# Patient Record
Sex: MALE | Race: WHITE | NOT HISPANIC OR LATINO | Employment: FULL TIME | URBAN - METROPOLITAN AREA
[De-identification: names, ages, dates, MRNs, and addresses within clinical notes are randomized per-mention and may not be internally consistent; named-entity substitution may affect disease eponyms.]

---

## 2017-01-13 RX ORDER — PITAVASTATIN CALCIUM 2.09 MG/1
TABLET, FILM COATED ORAL
Qty: 15 TABLET | Refills: 0 | Status: SHIPPED | OUTPATIENT
Start: 2017-01-13 | End: 2017-03-08 | Stop reason: SDUPTHER

## 2017-03-09 RX ORDER — PITAVASTATIN CALCIUM 2.09 MG/1
TABLET, FILM COATED ORAL
Qty: 15 TABLET | Refills: 0 | Status: SHIPPED | OUTPATIENT
Start: 2017-03-09 | End: 2017-04-05 | Stop reason: SDUPTHER

## 2017-04-06 RX ORDER — PITAVASTATIN CALCIUM 2.09 MG/1
TABLET, FILM COATED ORAL
Qty: 15 TABLET | Refills: 0 | Status: SHIPPED | OUTPATIENT
Start: 2017-04-06 | End: 2017-05-10 | Stop reason: SDUPTHER

## 2017-04-17 ENCOUNTER — OFFICE VISIT (OUTPATIENT)
Dept: CARDIOLOGY | Facility: CLINIC | Age: 52
End: 2017-04-17

## 2017-04-17 VITALS
SYSTOLIC BLOOD PRESSURE: 118 MMHG | BODY MASS INDEX: 30.8 KG/M2 | HEIGHT: 68 IN | HEART RATE: 66 BPM | DIASTOLIC BLOOD PRESSURE: 72 MMHG | RESPIRATION RATE: 16 BRPM | WEIGHT: 203.2 LBS

## 2017-04-17 DIAGNOSIS — I25.10 CORONARY ARTERY DISEASE INVOLVING NATIVE CORONARY ARTERY OF NATIVE HEART WITHOUT ANGINA PECTORIS: Primary | ICD-10-CM

## 2017-04-17 DIAGNOSIS — E78.2 MIXED HYPERLIPIDEMIA: ICD-10-CM

## 2017-04-17 PROCEDURE — 93000 ELECTROCARDIOGRAM COMPLETE: CPT | Performed by: INTERNAL MEDICINE

## 2017-04-17 PROCEDURE — 99213 OFFICE O/P EST LOW 20 MIN: CPT | Performed by: INTERNAL MEDICINE

## 2017-04-17 NOTE — PROGRESS NOTES
PATIENTINFORMATION    Date of Office Visit: 2017  Encounter Provider: Shyann Robertson MD  Place of Service: [facility] CARDIOLOGY  Patient Name: Bert Addison  : 1965    Subjective:     Encounter Date:2017      Patient ID: Bert Addison is a 51 y.o. male.      History of Present Illness     This is a nice gentleman who self-referred for chest pain in 2015.  His symptoms were very typical so I sent him straight for a heart catheterization.  This showed a significant lesion in the proximal ramus branch and a significant lesion in the mid-right coronary artery, both of which were stented with drug-eluting stents.      He has done very well since that time.  Last year, I encouraged exercise and weight loss, and he did it and lost about 20 pounds.  Unfortunately, he got sick with some allergy problems in the fall and stopped exercising and the weight has crept back.  He is motivated to get himself back on track as far as his diet and exercising.  Right now, he is getting over an episode of diverticulitis and has not started an exercise program yet.  He feels good though and denies chest pain, shortness of breath, edema, lightheadedness, or palpitations.        Review of Systems   Constitution: Positive for malaise/fatigue. Negative for fever, weight gain and weight loss.   HENT: Negative for ear pain, hearing loss, nosebleeds and sore throat.    Eyes: Negative for double vision, pain, vision loss in left eye and vision loss in right eye.   Cardiovascular:        See history of present illness.   Respiratory: Negative for cough, shortness of breath, sleep disturbances due to breathing, snoring and wheezing.    Endocrine: Negative for cold intolerance, heat intolerance and polyuria.   Skin: Negative for itching, poor wound healing and rash.   Musculoskeletal: Negative for joint pain, joint swelling and myalgias.   Gastrointestinal: Positive for abdominal pain.  "Negative for diarrhea, hematochezia, nausea and vomiting.   Genitourinary: Negative for hematuria and hesitancy.   Neurological: Negative for numbness, paresthesias and seizures.   Psychiatric/Behavioral: Negative for depression. The patient is not nervous/anxious.            ECG 12 Lead  Date/Time: 4/17/2017 2:14 PM  Performed by: DULCE KELSEY  Authorized by: DULCE KELSEY   Comparison: compared with previous ECG from 1/15/2016  Similar to previous ECG  Rhythm: sinus rhythm  BPM: 66  Conduction: conduction normal  ST Segments: ST segments normal  T Waves: T waves normal  Clinical impression: normal ECG               Objective:     /72 (BP Location: Right arm, Patient Position: Sitting, Cuff Size: Adult)  Pulse 66  Resp 16  Ht 68\" (172.7 cm)  Wt 203 lb 3.2 oz (92.2 kg)  BMI 30.9 kg/m2 Body mass index is 30.9 kg/(m^2).     Physical Exam   Constitutional: He appears well-developed.   HENT:   Head: Normocephalic and atraumatic.   Eyes: Conjunctivae and lids are normal. Pupils are equal, round, and reactive to light. Lids are everted and swept, no foreign bodies found.   Neck: Normal range of motion. No JVD present. Carotid bruit is not present. No tracheal deviation present. No thyroid mass present.   Cardiovascular: Normal rate, regular rhythm and normal heart sounds.    Pulses:       Dorsalis pedis pulses are 2+ on the right side, and 2+ on the left side.   Pulmonary/Chest: Effort normal and breath sounds normal.   Abdominal: Normal appearance and bowel sounds are normal.   Musculoskeletal: Normal range of motion.   Neurological: He is alert. He has normal strength.   Skin: Skin is warm, dry and intact.   Psychiatric: He has a normal mood and affect. His behavior is normal.   Vitals reviewed.          Assessment/Plan:       1. Coronary Artery Disease  Assessment  • The patient has no angina    Plan  • Lifestyle modifications discussed include adhering to a heart healthy diet, avoidance of tobacco " products, maintenance of a healthy weight and regular exercise  • I encouraged him to get back and exercising.  He is due for a lipid panel.  I put the orders in.  He is going to make an appointment to see Dr. Mast.  He wonders about increasing the Livalo but I would like to see where his LDL is before making that change.    I will see him back in one year unless he is having problems sooner    Subjective - Objective  • There has been a previous stent procedure using PAM on or around 1/19/2015  • Current antiplatelet therapy includes aspirin 81 mg      Orders Placed This Encounter   Procedures   • Lipid Panel     Standing Status:   Future     Standing Expiration Date:   4/17/2018   • Comprehensive Metabolic Panel     Standing Status:   Future     Standing Expiration Date:   4/17/2018   • CBC (No Diff)     Standing Status:   Future     Standing Expiration Date:   4/17/2018      Bert Addison   Home Medication Instructions HAILEY:    Printed on:04/17/17 3868   Medication Information                      aspirin 81 MG tablet  Take  by mouth.             LIVALO 2 MG tablet tablet  TAKE 1/2 TABLET BY MOUTH DAILY                        Shyann Robertson MD  04/17/17  1:52 PM

## 2017-04-18 ENCOUNTER — OFFICE VISIT (OUTPATIENT)
Dept: SPORTS MEDICINE | Facility: CLINIC | Age: 52
End: 2017-04-18

## 2017-04-18 VITALS
OXYGEN SATURATION: 97 % | SYSTOLIC BLOOD PRESSURE: 120 MMHG | BODY MASS INDEX: 30.77 KG/M2 | HEIGHT: 68 IN | HEART RATE: 80 BPM | TEMPERATURE: 98.6 F | DIASTOLIC BLOOD PRESSURE: 80 MMHG | WEIGHT: 203 LBS

## 2017-04-18 DIAGNOSIS — M75.41 SHOULDER IMPINGEMENT, RIGHT: ICD-10-CM

## 2017-04-18 DIAGNOSIS — Z00.00 ANNUAL PHYSICAL EXAM: ICD-10-CM

## 2017-04-18 DIAGNOSIS — E78.2 MIXED HYPERLIPIDEMIA: ICD-10-CM

## 2017-04-18 DIAGNOSIS — M54.2 NECK PAIN: ICD-10-CM

## 2017-04-18 DIAGNOSIS — K57.32 DIVERTICULITIS OF LARGE INTESTINE WITHOUT PERFORATION OR ABSCESS WITHOUT BLEEDING: Primary | ICD-10-CM

## 2017-04-18 PROCEDURE — 99214 OFFICE O/P EST MOD 30 MIN: CPT | Performed by: FAMILY MEDICINE

## 2017-04-18 RX ORDER — CIPROFLOXACIN 500 MG/1
TABLET, FILM COATED ORAL
Refills: 0 | Status: ON HOLD | COMMUNITY
Start: 2017-04-11 | End: 2017-07-24

## 2017-04-18 RX ORDER — METRONIDAZOLE 500 MG/1
TABLET ORAL
Refills: 0 | COMMUNITY
Start: 2017-04-11 | End: 2017-04-18

## 2017-04-18 RX ORDER — HYDROCODONE BITARTRATE AND ACETAMINOPHEN 5; 325 MG/1; MG/1
TABLET ORAL
Refills: 0 | COMMUNITY
Start: 2017-04-11 | End: 2017-04-18

## 2017-04-18 NOTE — PROGRESS NOTES
"Bert is a 51 y.o. year old male    Chief Complaint   Patient presents with   • Hyperlipidemia     Pt would like to discuss   • Diverticulitis       History of Present Illness   HPI   Here to follow up on recent ER visit for diverticulitis. Last week he went to the ER because of lower abdominal pain, diagnosed with diverticulitis by CT scan. Treated with flagyl x 7 days and cipro x 10 days; feels about 50% better now. Pain is improving, dull in lower abdomen. Assoc with decreased appetite. No fever. *Never got his cscope last year    Also R shoulder pain, moderately severe, NKI, worse with lifting the arm. On and off for months. No n/t but assoc with pain at the base of the neck.    Due for routine labs as well. HLD/CAD stable.    I have reviewed the patient's medical history in detail and updated the computerized patient record.    Review of Systems   Constitutional: Positive for fatigue. Negative for chills and fever.   Respiratory: Negative.    Gastrointestinal: Negative for blood in stool.   Musculoskeletal: Positive for arthralgias and neck pain.   Neurological: Negative for weakness and numbness.       /80  Pulse 80  Temp 98.6 °F (37 °C)  Ht 68\" (172.7 cm)  Wt 203 lb (92.1 kg)  SpO2 97%  BMI 30.87 kg/m2     Physical Exam   Constitutional: He appears well-developed and well-nourished.   HENT:   Mouth/Throat: Oropharynx is clear and moist.   Neck: Normal range of motion.   Pulmonary/Chest: Effort normal.   Abdominal: Soft. Bowel sounds are normal. He exhibits no distension and no mass. There is tenderness (mild, LLQ). There is no rebound and no guarding.   Musculoskeletal:   R shoulder: Normal appearance. TTP subacromial space and posteriorly in the levator, trap, and rhomboids. Normal ROM but + painful arc, neer, garcia. Normal strength. Equivocal gonzalez.    Psychiatric: He has a normal mood and affect.   Vitals reviewed.       Diagnoses and all orders for this visit:    Diverticulitis of large " intestine without perforation or abscess without bleeding    Shoulder impingement, right  -     Ambulatory Referral to Physical Therapy Evaluate and treat    Neck pain  -     Ambulatory Referral to Physical Therapy Evaluate and treat    Mixed hyperlipidemia  -     CBC & Differential  -     Comprehensive Metabolic Panel  -     Lipid Panel    Annual physical exam  -     PSA  -     Thyroid Cascade Profile  -     Hepatitis C Antibody    Other orders  -     PENNSAID 2 % solution; Apply 1-2 pumps to affected area BID prn pain  -     Discontinue: metroNIDAZOLE (FLAGYL) 500 MG tablet; TK 1 T PO Q 12 H FOR 7 DAYS  -     ciprofloxacin (CIPRO) 500 MG tablet; TK 1 T PO Q 12 H FOR 10 DAYS  -     Discontinue: HYDROcodone-acetaminophen (NORCO) 5-325 MG per tablet; TK 1 TO 2 TS PO Q 6 H PRN P.    Diverticulitis appears to be clearing well. If not continuing to improve over the next 1-2 weeks, will need to consider repeat CT. Reminded of importance of colonoscopy screening, wait a few weeks after this resolved.     PT for impingement and neck pain (secondary plus ergonomic issues). Try pennsaid as well.    Repeat labs, continue statin.

## 2017-04-19 LAB
ALBUMIN SERPL-MCNC: 4.3 G/DL (ref 3.5–5.2)
ALBUMIN/GLOB SERPL: 1.3 G/DL
ALP SERPL-CCNC: 47 U/L (ref 39–117)
ALT SERPL-CCNC: 45 U/L (ref 1–41)
AST SERPL-CCNC: 44 U/L (ref 1–40)
BASOPHILS # BLD AUTO: 0.02 10*3/MM3 (ref 0–0.2)
BASOPHILS NFR BLD AUTO: 0.3 % (ref 0–1.5)
BILIRUB SERPL-MCNC: 0.6 MG/DL (ref 0.1–1.2)
BUN SERPL-MCNC: 11 MG/DL (ref 6–20)
BUN/CREAT SERPL: 12.6 (ref 7–25)
CALCIUM SERPL-MCNC: 9.3 MG/DL (ref 8.6–10.5)
CHLORIDE SERPL-SCNC: 99 MMOL/L (ref 98–107)
CHOLEST SERPL-MCNC: 100 MG/DL (ref 0–200)
CO2 SERPL-SCNC: 25.6 MMOL/L (ref 22–29)
CREAT SERPL-MCNC: 0.87 MG/DL (ref 0.76–1.27)
EOSINOPHIL # BLD AUTO: 0.13 10*3/MM3 (ref 0–0.7)
EOSINOPHIL NFR BLD AUTO: 1.9 % (ref 0.3–6.2)
ERYTHROCYTE [DISTWIDTH] IN BLOOD BY AUTOMATED COUNT: 12.7 % (ref 11.5–14.5)
GLOBULIN SER CALC-MCNC: 3.4 GM/DL
GLUCOSE SERPL-MCNC: 88 MG/DL (ref 65–99)
HCT VFR BLD AUTO: 41.4 % (ref 40.4–52.2)
HCV AB S/CO SERPL IA: >11 S/CO RATIO (ref 0–0.9)
HDLC SERPL-MCNC: 37 MG/DL (ref 40–60)
HGB BLD-MCNC: 13.7 G/DL (ref 13.7–17.6)
IMM GRANULOCYTES # BLD: 0.02 10*3/MM3 (ref 0–0.03)
IMM GRANULOCYTES NFR BLD: 0.3 % (ref 0–0.5)
LDLC SERPL CALC-MCNC: 47 MG/DL (ref 0–100)
LYMPHOCYTES # BLD AUTO: 1.29 10*3/MM3 (ref 0.9–4.8)
LYMPHOCYTES NFR BLD AUTO: 19.1 % (ref 19.6–45.3)
MCH RBC QN AUTO: 28.3 PG (ref 27–32.7)
MCHC RBC AUTO-ENTMCNC: 33.1 G/DL (ref 32.6–36.4)
MCV RBC AUTO: 85.5 FL (ref 79.8–96.2)
MONOCYTES # BLD AUTO: 0.41 10*3/MM3 (ref 0.2–1.2)
MONOCYTES NFR BLD AUTO: 6.1 % (ref 5–12)
NEUTROPHILS # BLD AUTO: 4.89 10*3/MM3 (ref 1.9–8.1)
NEUTROPHILS NFR BLD AUTO: 72.3 % (ref 42.7–76)
PLATELET # BLD AUTO: 137 10*3/MM3 (ref 140–500)
POTASSIUM SERPL-SCNC: 4.2 MMOL/L (ref 3.5–5.2)
PROT SERPL-MCNC: 7.7 G/DL (ref 6–8.5)
PSA SERPL-MCNC: 4.64 NG/ML (ref 0–4)
RBC # BLD AUTO: 4.84 10*6/MM3 (ref 4.6–6)
SODIUM SERPL-SCNC: 139 MMOL/L (ref 136–145)
TRIGL SERPL-MCNC: 80 MG/DL (ref 0–150)
TSH SERPL DL<=0.005 MIU/L-ACNC: 1.84 UIU/ML (ref 0.45–4.5)
VLDLC SERPL CALC-MCNC: 16 MG/DL (ref 5–40)
WBC # BLD AUTO: 6.76 10*3/MM3 (ref 4.5–10.7)

## 2017-04-20 DIAGNOSIS — R76.8 HEPATITIS C ANTIBODY TEST POSITIVE: Primary | ICD-10-CM

## 2017-04-24 LAB
HBV SURFACE AG SERPL QL IA: NEGATIVE
HCV RNA SERPL NAA+PROBE-ACNC: NORMAL IU/ML
HCV RNA SERPL NAA+PROBE-LOG IU: 6.88 LOG10 IU/ML
Lab: NORMAL
SPECIMEN STATUS: NORMAL
TEST INFORMATION: NORMAL
WRITTEN AUTHORIZATION: NORMAL

## 2017-04-26 ENCOUNTER — TELEPHONE (OUTPATIENT)
Dept: SPORTS MEDICINE | Facility: CLINIC | Age: 52
End: 2017-04-26

## 2017-04-26 DIAGNOSIS — B19.20 HEPATITIS C VIRUS INFECTION WITHOUT HEPATIC COMA, UNSPECIFIED CHRONICITY: ICD-10-CM

## 2017-04-26 DIAGNOSIS — R76.8 HEPATITIS C ANTIBODY TEST POSITIVE: Primary | ICD-10-CM

## 2017-04-26 NOTE — TELEPHONE ENCOUNTER
----- Message from Alfredo Mast MD sent at 4/26/2017 12:23 PM EDT -----  Tried to call patient. Follow up hepatitis testing appears to be positive - order placed for referral to GI to eval and discuss further. As usual, I'm glad to talk to the patient more about the details of these results personally when we can coordinate by phone or in person.

## 2017-05-11 RX ORDER — PITAVASTATIN CALCIUM 2.09 MG/1
TABLET, FILM COATED ORAL
Qty: 15 TABLET | Refills: 2 | Status: SHIPPED | OUTPATIENT
Start: 2017-05-11 | End: 2017-08-12 | Stop reason: SDUPTHER

## 2017-05-30 ENCOUNTER — OFFICE VISIT (OUTPATIENT)
Dept: GASTROENTEROLOGY | Facility: CLINIC | Age: 52
End: 2017-05-30

## 2017-05-30 VITALS
DIASTOLIC BLOOD PRESSURE: 92 MMHG | BODY MASS INDEX: 30.86 KG/M2 | HEIGHT: 68 IN | SYSTOLIC BLOOD PRESSURE: 122 MMHG | WEIGHT: 203.6 LBS

## 2017-05-30 DIAGNOSIS — Z12.11 ENCOUNTER FOR SCREENING FOR MALIGNANT NEOPLASM OF COLON: ICD-10-CM

## 2017-05-30 DIAGNOSIS — B18.2 CHRONIC HEPATITIS C WITHOUT HEPATIC COMA (HCC): Primary | ICD-10-CM

## 2017-05-30 LAB
INR PPP: 1.02 (ref 0.9–1.1)
PROTHROMBIN TIME: 13 SECONDS (ref 11.7–14.2)

## 2017-05-30 PROCEDURE — 99204 OFFICE O/P NEW MOD 45 MIN: CPT | Performed by: INTERNAL MEDICINE

## 2017-05-30 RX ORDER — SODIUM CHLORIDE, SODIUM LACTATE, POTASSIUM CHLORIDE, CALCIUM CHLORIDE 600; 310; 30; 20 MG/100ML; MG/100ML; MG/100ML; MG/100ML
30 INJECTION, SOLUTION INTRAVENOUS CONTINUOUS
Status: CANCELLED | OUTPATIENT
Start: 2017-05-30

## 2017-05-30 RX ORDER — SODIUM CHLORIDE 0.9 % (FLUSH) 0.9 %
1-10 SYRINGE (ML) INJECTION AS NEEDED
Status: CANCELLED | OUTPATIENT
Start: 2017-05-30

## 2017-06-02 ENCOUNTER — HOSPITAL ENCOUNTER (OUTPATIENT)
Dept: ULTRASOUND IMAGING | Facility: HOSPITAL | Age: 52
Discharge: HOME | End: 2017-06-02
Attending: INTERNAL MEDICINE | Admitting: INTERNAL MEDICINE

## 2017-06-02 DIAGNOSIS — B18.2 CHRONIC HEPATITIS C WITHOUT HEPATIC COMA (HCC): ICD-10-CM

## 2017-06-02 LAB
A2 MACROGLOB SERPL-MCNC: 177 MG/DL (ref 110–276)
ALT SERPL W P-5'-P-CCNC: 29 IU/L (ref 0–55)
APO A-I SERPL-MCNC: 126 MG/DL (ref 101–178)
BILIRUB SERPL-MCNC: 0.8 MG/DL (ref 0–1.2)
FIBROSIS SCORING:: ABNORMAL
FIBROSIS STAGE SERPL QL: ABNORMAL
GGT SERPL-CCNC: 17 IU/L (ref 0–65)
HAPTOGLOB SERPL-MCNC: 83 MG/DL (ref 34–200)
HCV AB SER QL: ABNORMAL
HCV GENTYP SERPL NAA+PROBE: NORMAL
HCV RNA SERPL NAA+PROBE-ACNC: NORMAL IU/ML
HCV RNA SERPL NAA+PROBE-LOG IU: 6.77 LOG10 IU/ML
LABORATORY COMMENT REPORT: ABNORMAL
LIVER FIBR SCORE SERPL CALC.FIBROSURE: 0.3 (ref 0–0.21)
Lab: NORMAL
NECROINFLAMM ACTIVITY SCORING:: ABNORMAL
NECROINFLAMMATORY ACT GRADE SERPL QL: ABNORMAL
NECROINFLAMMATORY ACT SCORE SERPL: 0.14 (ref 0–0.17)
SERVICE CMNT-IMP: ABNORMAL
TEST INFORMATION: NORMAL
TSH SERPL DL<=0.005 MIU/L-ACNC: 1.87 MIU/ML (ref 0.27–4.2)

## 2017-06-02 PROCEDURE — 76705 ECHO EXAM OF ABDOMEN: CPT

## 2017-06-21 ENCOUNTER — TELEPHONE (OUTPATIENT)
Dept: GASTROENTEROLOGY | Facility: CLINIC | Age: 52
End: 2017-06-21

## 2017-06-21 NOTE — TELEPHONE ENCOUNTER
Notes Recorded by Henry Monae MD on 6/8/2017 at 2:07 PM  Normal u/s of liver.  Patient called requesting the results of his ultrasound.  Advised it was normal. He verb understanding.

## 2017-07-24 ENCOUNTER — HOSPITAL ENCOUNTER (OUTPATIENT)
Facility: HOSPITAL | Age: 52
Setting detail: HOSPITAL OUTPATIENT SURGERY
Discharge: HOME | End: 2017-07-24
Attending: INTERNAL MEDICINE | Admitting: INTERNAL MEDICINE

## 2017-07-24 ENCOUNTER — ANESTHESIA EVENT (OUTPATIENT)
Dept: GASTROENTEROLOGY | Facility: HOSPITAL | Age: 52
End: 2017-07-24

## 2017-07-24 ENCOUNTER — ANESTHESIA (OUTPATIENT)
Dept: GASTROENTEROLOGY | Facility: HOSPITAL | Age: 52
End: 2017-07-24

## 2017-07-24 VITALS
BODY MASS INDEX: 30.34 KG/M2 | HEART RATE: 63 BPM | WEIGHT: 200.19 LBS | SYSTOLIC BLOOD PRESSURE: 105 MMHG | RESPIRATION RATE: 16 BRPM | TEMPERATURE: 97.7 F | DIASTOLIC BLOOD PRESSURE: 79 MMHG | OXYGEN SATURATION: 94 % | HEIGHT: 68 IN

## 2017-07-24 DIAGNOSIS — Z12.11 ENCOUNTER FOR SCREENING FOR MALIGNANT NEOPLASM OF COLON: ICD-10-CM

## 2017-07-24 DIAGNOSIS — B18.2 CHRONIC HEPATITIS C WITHOUT HEPATIC COMA (HCC): ICD-10-CM

## 2017-07-24 PROCEDURE — 88305 TISSUE EXAM BY PATHOLOGIST: CPT | Performed by: INTERNAL MEDICINE

## 2017-07-24 PROCEDURE — 45380 COLONOSCOPY AND BIOPSY: CPT | Performed by: INTERNAL MEDICINE

## 2017-07-24 PROCEDURE — 25010000002 PROPOFOL 10 MG/ML EMULSION: Performed by: ANESTHESIOLOGY

## 2017-07-24 RX ORDER — LIDOCAINE HYDROCHLORIDE 20 MG/ML
INJECTION, SOLUTION INFILTRATION; PERINEURAL AS NEEDED
Status: DISCONTINUED | OUTPATIENT
Start: 2017-07-24 | End: 2017-07-24 | Stop reason: SURG

## 2017-07-24 RX ORDER — SODIUM CHLORIDE 0.9 % (FLUSH) 0.9 %
1-10 SYRINGE (ML) INJECTION AS NEEDED
Status: DISCONTINUED | OUTPATIENT
Start: 2017-07-24 | End: 2017-07-24 | Stop reason: HOSPADM

## 2017-07-24 RX ORDER — SODIUM CHLORIDE, SODIUM LACTATE, POTASSIUM CHLORIDE, CALCIUM CHLORIDE 600; 310; 30; 20 MG/100ML; MG/100ML; MG/100ML; MG/100ML
30 INJECTION, SOLUTION INTRAVENOUS CONTINUOUS
Status: DISCONTINUED | OUTPATIENT
Start: 2017-07-24 | End: 2017-07-24 | Stop reason: HOSPADM

## 2017-07-24 RX ORDER — PROPOFOL 10 MG/ML
VIAL (ML) INTRAVENOUS CONTINUOUS PRN
Status: DISCONTINUED | OUTPATIENT
Start: 2017-07-24 | End: 2017-07-24 | Stop reason: SURG

## 2017-07-24 RX ADMIN — ALFENTANIL HYDROCHLORIDE 500 MCG: 500 INJECTION, SOLUTION INTRAVENOUS at 10:09

## 2017-07-24 RX ADMIN — SODIUM CHLORIDE, POTASSIUM CHLORIDE, SODIUM LACTATE AND CALCIUM CHLORIDE 30 ML/HR: 600; 310; 30; 20 INJECTION, SOLUTION INTRAVENOUS at 09:43

## 2017-07-24 RX ADMIN — LIDOCAINE HYDROCHLORIDE 50 MG: 20 INJECTION, SOLUTION INFILTRATION; PERINEURAL at 10:09

## 2017-07-24 RX ADMIN — PROPOFOL 180 MCG/KG/MIN: 10 INJECTION, EMULSION INTRAVENOUS at 10:09

## 2017-07-24 NOTE — ANESTHESIA PREPROCEDURE EVALUATION
Anesthesia Evaluation     Patient summary reviewed and Nursing notes reviewed   no history of anesthetic complications:  NPO Solid Status: > 6 hours  NPO Liquid Status: > 6 hours     Airway   Mallampati: II  TM distance: >3 FB  Neck ROM: full  no difficulty expected  Dental - normal exam     Pulmonary - normal exam    breath sounds clear to auscultation  (+) sleep apnea,   (-) rhonchi, decreased breath sounds, wheezes, rales, stridor  Cardiovascular - normal exam    Rhythm: regular  Rate: normal    (+) CAD, hyperlipidemia  (-) angina, murmur, weak pulses, friction rub, systolic click, carotid bruits, JVD, peripheral edema      Neuro/Psych- negative ROS  GI/Hepatic/Renal/Endo    (+) obesity,      Musculoskeletal (-) negative ROS    Abdominal   (+) obese,     Abdomen: soft.   Substance History - negative use     OB/GYN negative ob/gyn ROS         Other - negative ROS                                       Anesthesia Plan    ASA 3     MAC     intravenous induction   Anesthetic plan and risks discussed with patient.

## 2017-07-24 NOTE — H&P
Chief Complaint   Patient presents with   • Hepatitis C         Subjective          HPI      Bert Addison is a 51 y.o. male who presents for evaluation of hepatitis C.     Recently diagnosed with Hepatitis C by PCP due to slight elevation of LFTs.  1st made aware of + HCV Ab when in  in mid-90s.  Thinks he underwent liver bx around that time which was unremarkable.  He had follow up testing a few years later and was told no virus was detected.  Mode of acquisition is unclear.  No IVDA or known sexual contacts. No tatoos.  No blood transfusions.  No complaints of fatigue, weight loss, or specific GI issues.  Hx of CAD with two stents placed in 2015.  He takes regular ASA.  No angina.  He apparently had an episode of diverticulitis about 4 weeks ago which has responded well to oral antibiotics.  No prior colonoscopy.       No genotype available.  Viral load 7.6 million IU/mL.  No prior liver imaging.       Medical History         Past Medical History:   Diagnosis Date   • 3-vessel CAD     • Chest pain     • Headache, tension-type     • Hyperlipidemia     • Impingement syndrome of right shoulder     • Migraine     • Myalgia     • Sleep apnea              Current Outpatient Prescriptions:   •  LIVALO 2 MG tablet tablet, TAKE ONE HALF TABLET BY MOUTH EVERY DAY, Disp: 15 tablet, Rfl: 2  •  aspirin 81 MG tablet, Take  by mouth., Disp: , Rfl:   •  ciprofloxacin (CIPRO) 500 MG tablet, TK 1 T PO Q 12 H FOR 10 DAYS, Disp: , Rfl: 0  •  PENNSAID 2 % solution, Apply 1-2 pumps to affected area BID prn pain, Disp: 112 g, Rfl: 3  No Known Allergies       Social History    Social History            Social History   • Marital status:        Spouse name: N/A   • Number of children: N/A   • Years of education: N/A          Occupational History   • Not on file.             Social History Main Topics   • Smoking status: Former Smoker       Packs/day: 1.00       Years: 30.00       Types: Cigarettes        Quit date: 2012   • Smokeless tobacco: Not on file   • Alcohol use No         Comment: daily caffeine use   • Drug use: No   • Sexual activity: Defer           Other Topics Concern   • Not on file      Social History Narrative                Family History   Problem Relation Age of Onset   • Heart disease Mother     • Heart attack Mother     • Other Mother         Pacemaker   • Heart disease Father     • Hypertension Sister     • Heart attack Sister     • Heart attack Brother        Review of Systems   Constitutional: Negative.    HENT: Negative.    Eyes: Negative.    Respiratory: Negative.    Cardiovascular: Negative.    Gastrointestinal: Negative.    Skin: Negative.    Psychiatric/Behavioral: Negative.    All other systems reviewed and are negative.           Objective           Vitals:     05/30/17 1001   BP: 122/92      Physical Exam   Constitutional: He is oriented to person, place, and time. He appears well-developed and well-nourished.   HENT:   Head: Normocephalic and atraumatic.   Mouth/Throat: Oropharynx is clear and moist.   Eyes: Conjunctivae are normal. No scleral icterus.   Neck: Normal range of motion. Neck supple. No thyromegaly present.   Cardiovascular: Normal rate and regular rhythm.  Exam reveals no friction rub.    No murmur heard.  Pulmonary/Chest: Effort normal and breath sounds normal. No respiratory distress. He has no wheezes. He has no rales. He exhibits no tenderness.   Abdominal: Soft. Bowel sounds are normal. He exhibits no distension and no mass. There is no tenderness. There is no rebound and no guarding. No hernia.   Musculoskeletal: He exhibits no edema.   Lymphadenopathy:     He has no cervical adenopathy.     He has no axillary adenopathy.   Neurological: He is alert and oriented to person, place, and time.   Skin: Skin is warm and dry. No rash noted.   Psychiatric: He has a normal mood and affect. His behavior is normal. Judgment and thought content normal.   Vitals  reviewed.           Assessment/Plan          Bert was seen today for hepatitis c.     Diagnoses and all orders for this visit:     Chronic hepatitis C without hepatic coma  -     US Liver; Future  -     TSH  -     Hepatitis C RNA, Quantitative, PCR (graph)  -     Hepatitis C Genotype  -     HCV FibroSURE  -     Case Request; Standing  -     sodium chloride 0.9 % flush 1-10 mL; Infuse 1-10 mL into a venous catheter As Needed for Line Care.  -     lactated ringers infusion; Infuse 30 mL/hr into a venous catheter Continuous.  -     Case Request  -     Protime-INR     Encounter for screening for malignant neoplasm of colon  -     Case Request; Standing  -     sodium chloride 0.9 % flush 1-10 mL; Infuse 1-10 mL into a venous catheter As Needed for Line Care.  -     lactated ringers infusion; Infuse 30 mL/hr into a venous catheter Continuous.  -     Case Request  -     Protime-INR     Other orders  -     Implement Anesthesia orders day of procedure.; Standing  -     Obtain informed consent; Standing  -     Verify bowel prep was successful; Standing  -     Give tap water enema if bowel prep was insufficient; Standing  -     Insert Peripheral IV; Standing  -     Saline Lock & Maintain IV Access; Standing     Will check HCV genotype and HCV Fibrosure today - pt would appear to be good candidate for Harvoni assuming G1.  Will arrange for average risk screening colonoscopy in 4-6 weeks  Check baseline abdominal u/s.  Pt does have mild thrombocytopenia which may or may not be related to his underlying HCV.               Henry Monae M.D.  [facility], [place], operated by [facility]  [address] [place] [address]  Office: [phone]

## 2017-07-24 NOTE — PLAN OF CARE
Problem: Patient Care Overview (Adult)  Goal: Adult Individualization and Mutuality  Outcome: Ongoing (interventions implemented as appropriate)  Goal: Discharge Needs Assessment  Outcome: Ongoing (interventions implemented as appropriate)    Problem: GI Endoscopy (Adult)  Goal: Signs and Symptoms of Listed Potential Problems Will be Absent or Manageable (GI Endoscopy)  Outcome: Ongoing (interventions implemented as appropriate)    07/24/17 0917   GI Endoscopy   Problems Assessed (GI Endoscopy) all   Problems Present (GI Endoscopy) none

## 2017-07-24 NOTE — ANESTHESIA POSTPROCEDURE EVALUATION
Patient: Bert Addison    Procedure Summary     Date Anesthesia Start Anesthesia Stop Room / Location    07/24/17 1007 1030  [facility] /  [facility]       Procedure Diagnosis Surgeon Provider    COLONOSCOPY to cecum and TI with biopsy of ileocecal valve (N/A ) Encounter for screening for malignant neoplasm of colon; Chronic hepatitis C without hepatic coma  (Encounter for screening for malignant neoplasm of colon [Z12.11]; Chronic hepatitis C without hepatic coma [B18.2]) MD Rey Gamble MD          Anesthesia Type: MAC  Last vitals  BP   105/79 (07/24/17 1050)    Temp        Pulse   63 (07/24/17 1050)   Resp   16 (07/24/17 1050)    SpO2   94 % (07/24/17 1050)      Post Anesthesia Care and Evaluation      Comments: Patient discharged before being evaluated by an Anesthesiologist. No apparent complications per the record.  This case was not medically directed. I am completing this chart for medical records purposes; I personally have no medical involvement with this patient.--------------------            07/24/17               1050     --------------------   BP:       105/79     Pulse:      63       Resp:       16       Temp:                SpO2:      94%      --------------------

## 2017-07-24 NOTE — DISCHARGE INSTRUCTIONS
For the next 24 hours patient needs to be with a responsible adult.    For 24 hours DO NOT drive, operate machinery, appliances, drink alcohol, make important decisions or sign legal documents.    Start with a light or bland diet and advance to regular diet as tolerated.    Follow recommendations on procedure report provided by your doctor.    Call Dr Monae for problems [phone]    Problems may include but not limited to: large amounts of bleeding, trouble breathing, repeated vomiting, severe unrelieved pain, fever or chills.

## 2017-07-25 LAB
CYTO UR: NORMAL
LAB AP CASE REPORT: NORMAL
Lab: NORMAL
PATH REPORT.FINAL DX SPEC: NORMAL
PATH REPORT.GROSS SPEC: NORMAL

## 2017-08-07 ENCOUNTER — TELEPHONE (OUTPATIENT)
Dept: GASTROENTEROLOGY | Facility: CLINIC | Age: 52
End: 2017-08-07

## 2017-08-07 NOTE — TELEPHONE ENCOUNTER
Pt called the office and was extremely rude and states he needs to make an appt to follow up from his c/s. Pt states Dr Monae promised him last month that he would personally call him with U/S results but he never did. Pt states he had to call the office for results and had to call multiple tines and when he finally was able to reach someone, he did not get an apology for the hold up, was just told that the u/s was normal. Pt states now he had a c/s and Dr Monae told him that someone would call him to set up an appt to get him the medication he needs. Pt states no one ever called him and he needs to make the appt. Pt states if he wasn't so far along in the process with Dr Monae, he would just switch Drs. Pt states all he does is make promises that he does not follow through on. Pt states he will see the doctor to get the medication then write the review later. Advised can go ahead and give him the path results and make the appt if he'd like. Pt states he doesn't need the path results, he just needs to make an appt and it's not that hard to understand. Pt states it is unbelievable how terribly this office is run and how Dr Monae dos not follow through on anything he says he'll do for his pts. Pt states he needs to make an appt if I think I can handle that. Offered appt for 9/1/17. Pt not happy that appt three weeks from now but appt made for 9/1/17 at 8:30 AM.

## 2017-08-08 ENCOUNTER — TELEPHONE (OUTPATIENT)
Dept: GASTROENTEROLOGY | Facility: CLINIC | Age: 52
End: 2017-08-08

## 2017-08-08 NOTE — TELEPHONE ENCOUNTER
Attempted to give pt path results during phone call yesterday, but pt would not let me speak long enough to let him know the biopsies came back normal. Dr Monae can advise of results when pt is seen 9/1/17.     Health Maintenance updated to reflect C/S due 7/2027.

## 2017-08-14 RX ORDER — PITAVASTATIN CALCIUM 2.09 MG/1
TABLET, FILM COATED ORAL
Qty: 15 TABLET | Refills: 0 | Status: SHIPPED | OUTPATIENT
Start: 2017-08-14 | End: 2017-09-14 | Stop reason: SDUPTHER

## 2017-08-16 ENCOUNTER — OFFICE VISIT (OUTPATIENT)
Dept: SPORTS MEDICINE | Facility: CLINIC | Age: 52
End: 2017-08-16

## 2017-08-16 VITALS
WEIGHT: 206 LBS | SYSTOLIC BLOOD PRESSURE: 120 MMHG | DIASTOLIC BLOOD PRESSURE: 84 MMHG | HEART RATE: 83 BPM | OXYGEN SATURATION: 98 % | HEIGHT: 68 IN | BODY MASS INDEX: 31.22 KG/M2

## 2017-08-16 DIAGNOSIS — M54.2 CERVICALGIA: Primary | ICD-10-CM

## 2017-08-16 PROCEDURE — 99214 OFFICE O/P EST MOD 30 MIN: CPT | Performed by: FAMILY MEDICINE

## 2017-08-16 PROCEDURE — 72040 X-RAY EXAM NECK SPINE 2-3 VW: CPT | Performed by: FAMILY MEDICINE

## 2017-08-16 RX ORDER — PREDNISONE 10 MG/1
TABLET ORAL
Qty: 30 TABLET | Refills: 0 | Status: SHIPPED | OUTPATIENT
Start: 2017-08-16 | End: 2017-08-24 | Stop reason: SINTOL

## 2017-08-16 NOTE — PROGRESS NOTES
"Bert is a 51 y.o. year old male    Chief Complaint   Patient presents with   • Cervical Spine - Pain       History of Present Illness  Chronic (over a year) L sided neck pain but worse this week. Better with Extension and worse with forward flexion.  No radicular symptoms.  No known trauma.  Also pain at night.  Describes the pain is constant.  Also causing some left-sided posterior headaches with radiation to the temple.  No focal motor or sensory symptoms.  He has tried Pennsaid with minimal relief.  Patient is reluctant to take any anti-inflammatories are muscle relaxants.  Pain tends to originate over the superior medial left scapula and then can radiate up into the neck and down to the lower scapular area on the left.  No chest pain or shortness of breath.  No syncope or presyncope.  No palpitations.  No nausea or vomiting or diaphoresis.    I have reviewed the patient's medical history in detail and updated the computerized patient record.    Review of Systems   Constitutional: Negative for fever.   Musculoskeletal: Positive for neck pain.   Skin: Negative for wound.   Neurological: Negative for numbness.   All other systems reviewed and are negative.      /84  Pulse 83  Ht 68\" (172.7 cm)  Wt 206 lb (93.4 kg)  SpO2 98%  BMI 31.32 kg/m2     Physical Exam    Vital signs reviewed.   General: No acute distress.  Eyes: conjunctiva clear; pupils equally round and reactive  ENT: external ears and nose atraumatic; oropharynx clear  CV: no peripheral edema, 2+ distal pulses  Resp: normal respiratory effort, no use of accessory muscles  Skin: no rashes or wounds; normal turgor  Psych: mood and affect appropriate; recent and remote memory intact  Neuro: sensation to light touch intact    MSK Exam:  Cervical spine normal in general appearance, there is some mild tinnitus to palpation along the medial and superior border of the trapezius on the left.  Patient has full painless range of motion of the neck " however he does have pain at the end of foward flexion and right lateral bending.  Negative Spurling.  DTRs 1+ symmetric upper extremities, motor 5 out of 5.  Neurovascularly intact.    Cervical Spine X-Ray    Indication: Pain  Views: AP and Lateral    Findings:  No fracture  No bony lesions, there is some anterior endplate spurring on the inferior portions of C4 and C5.  Soft tissues normal  Normal disc spaces    No prior studies are available for comparison.      Diagnoses and all orders for this visit:    Cervicalgia  -     XR Spine Cervical 2 or 3 View  -     predniSONE (DELTASONE) 10 MG tablet; 4 tabs qd x 3 d, then 3 tabs qd x 3 d, then 2 tabs qd x 3 d, then 1 tab qd  x 3 d  -     Ambulatory Referral to Physical Therapy Evaluate and treat  Offer the patient most relaxants which she has deferred at this time.    EMR Dragon/Transcription disclaimer:    Much of this encounter note is an electronic transcription/translation of spoken language to printed text.  The electronic translation of spoken language may permit erroneous, or at times, nonsensical words or phrases to be inadvertently transcribed.  Although I have reviewed the note for such errors some may still exist.

## 2017-08-18 ENCOUNTER — OFFICE VISIT (OUTPATIENT)
Dept: GASTROENTEROLOGY | Facility: CLINIC | Age: 52
End: 2017-08-18

## 2017-08-18 ENCOUNTER — TELEPHONE (OUTPATIENT)
Dept: SPORTS MEDICINE | Facility: CLINIC | Age: 52
End: 2017-08-18

## 2017-08-18 VITALS
DIASTOLIC BLOOD PRESSURE: 86 MMHG | WEIGHT: 209 LBS | HEIGHT: 68 IN | BODY MASS INDEX: 31.67 KG/M2 | SYSTOLIC BLOOD PRESSURE: 138 MMHG | TEMPERATURE: 98 F

## 2017-08-18 DIAGNOSIS — B18.2 CHRONIC HEPATITIS C WITHOUT HEPATIC COMA (HCC): Primary | ICD-10-CM

## 2017-08-18 PROCEDURE — 99214 OFFICE O/P EST MOD 30 MIN: CPT | Performed by: INTERNAL MEDICINE

## 2017-08-18 RX ORDER — TIZANIDINE HYDROCHLORIDE 4 MG/1
CAPSULE, GELATIN COATED ORAL
Qty: 30 CAPSULE | Refills: 0 | Status: SHIPPED | OUTPATIENT
Start: 2017-08-18 | End: 2017-10-13 | Stop reason: SDUPTHER

## 2017-08-18 NOTE — PROGRESS NOTES
Chief Complaint   Patient presents with   • Follow-up     Subjective     HPI     Bert Addison is a 51 y.o. male who presents today for f/u.  Pt underwent recent screening colonoscopy which was unremarkable with exception of lipomatous IC valve.  He is here to discuss treatement options for his chronic HCV.  He appears to have G2 HCV, with no prior tx.  HCV Fibrosure shows only F1 fibrosis, and abdominal u/s was unremarkable.      Past Medical History:   Diagnosis Date   • 3-vessel CAD    • Chest pain    • Headache, tension-type    • Hepatitis C    • Hyperlipidemia    • Impingement syndrome of right shoulder    • Migraine    • Myalgia    • Sleep apnea        Social History     Social History   • Marital status:      Spouse name: N/A   • Number of children: N/A   • Years of education: N/A     Social History Main Topics   • Smoking status: Former Smoker     Packs/day: 1.00     Years: 30.00     Types: Cigarettes     Quit date: 2012   • Smokeless tobacco: None   • Alcohol use No      Comment: daily caffeine use   • Drug use: No   • Sexual activity: Defer     Other Topics Concern   • None     Social History Narrative         Current Outpatient Prescriptions:   •  aspirin 81 MG tablet, Take  by mouth., Disp: , Rfl:   •  LIVALO 2 MG tablet tablet, TAKE 1/2 TABLET BY MOUTH EVERY DAY, Disp: 15 tablet, Rfl: 0  •  predniSONE (DELTASONE) 10 MG tablet, 4 tabs qd x 3 d, then 3 tabs qd x 3 d, then 2 tabs qd x 3 d, then 1 tab qd  x 3 d, Disp: 30 tablet, Rfl: 0  •  PENNSAID 2 % solution, Apply 1-2 pumps to affected area BID prn pain, Disp: 112 g, Rfl: 3    Review of Systems   Constitutional: Negative.    Respiratory: Negative.    Cardiovascular: Negative.    Gastrointestinal: Negative.        Objective   Vitals:    08/18/17 1056   BP: 138/86   Temp: 98 °F (36.7 °C)       Physical Exam   Constitutional: He is oriented to person, place, and time. He appears well-developed and well-nourished.   HENT:   Head: Normocephalic and  atraumatic.   Cardiovascular: Normal rate and regular rhythm.    Pulmonary/Chest: Effort normal and breath sounds normal. No respiratory distress.   Abdominal: Soft. Bowel sounds are normal. He exhibits no distension and no mass. There is no tenderness. No hernia.   Neurological: He is alert and oriented to person, place, and time.   Skin: Skin is warm and dry.   Psychiatric: He has a normal mood and affect. His behavior is normal. Judgment and thought content normal.   Vitals reviewed.      Assessment/Plan   Assessment:     1. Chronic hepatitis C without hepatic coma        Plan:   Pt with G2 HCV with F1 fibrosis on Fibrosure - will arrange for 12 weeks of Epclusa.  Risks/benefits as well as most common side-effects of therapy discussed in detail with patient.  He will need office f/u 4 weeks after starting therapy for repeat labs to include viral load  Check Hep B serology today and arrange for vaccination if necessary        Henry Monae M.D.  [facility]  [address] [place] [address]  Office: [phone]

## 2017-08-18 NOTE — PROGRESS NOTES
Chief Complaint   Patient presents with   • Follow-up     Subjective     HPI     Bert Addison is a 51 y.o. male who presents ***    Past Medical History:   Diagnosis Date   • 3-vessel CAD    • Chest pain    • Headache, tension-type    • Hepatitis C    • Hyperlipidemia    • Impingement syndrome of right shoulder    • Migraine    • Myalgia    • Sleep apnea        Social History     Social History   • Marital status:      Spouse name: N/A   • Number of children: N/A   • Years of education: N/A     Social History Main Topics   • Smoking status: Former Smoker     Packs/day: 1.00     Years: 30.00     Types: Cigarettes     Quit date: 2012   • Smokeless tobacco: None   • Alcohol use No      Comment: daily caffeine use   • Drug use: No   • Sexual activity: Defer     Other Topics Concern   • None     Social History Narrative         Current Outpatient Prescriptions:   •  aspirin 81 MG tablet, Take  by mouth., Disp: , Rfl:   •  LIVALO 2 MG tablet tablet, TAKE 1/2 TABLET BY MOUTH EVERY DAY, Disp: 15 tablet, Rfl: 0  •  predniSONE (DELTASONE) 10 MG tablet, 4 tabs qd x 3 d, then 3 tabs qd x 3 d, then 2 tabs qd x 3 d, then 1 tab qd  x 3 d, Disp: 30 tablet, Rfl: 0  •  PENNSAID 2 % solution, Apply 1-2 pumps to affected area BID prn pain, Disp: 112 g, Rfl: 3    Review of Systems    Objective   Vitals:    08/18/17 1056   BP: 138/86   Temp: 98 °F (36.7 °C)       Physical Exam    Assessment/Plan   Assessment:     1. Chronic hepatitis C without hepatic coma          Plan:           Henry Monae M.D.  [facility]  [address] [place] [address]  Office: [phone]

## 2017-08-21 LAB
ALBUMIN SERPL-MCNC: 4.3 G/DL (ref 3.5–5.2)
ALBUMIN/GLOB SERPL: 1.4 G/DL
ALP SERPL-CCNC: 44 U/L (ref 39–117)
ALT SERPL-CCNC: 53 U/L (ref 1–41)
AST SERPL-CCNC: 22 U/L (ref 1–40)
BASOPHILS # BLD AUTO: 0.02 10*3/MM3 (ref 0–0.2)
BASOPHILS NFR BLD AUTO: 0.3 % (ref 0–1.5)
BILIRUB SERPL-MCNC: 0.7 MG/DL (ref 0.1–1.2)
BUN SERPL-MCNC: 16 MG/DL (ref 6–20)
BUN/CREAT SERPL: 20.5 (ref 7–25)
CALCIUM SERPL-MCNC: 8.5 MG/DL (ref 8.6–10.5)
CHLORIDE SERPL-SCNC: 103 MMOL/L (ref 98–107)
CO2 SERPL-SCNC: 24.6 MMOL/L (ref 22–29)
CREAT SERPL-MCNC: 0.78 MG/DL (ref 0.76–1.27)
EOSINOPHIL # BLD AUTO: 0.05 10*3/MM3 (ref 0–0.7)
EOSINOPHIL NFR BLD AUTO: 0.8 % (ref 0.3–6.2)
ERYTHROCYTE [DISTWIDTH] IN BLOOD BY AUTOMATED COUNT: 13.6 % (ref 11.5–14.5)
GLOBULIN SER CALC-MCNC: 3.1 GM/DL
GLUCOSE SERPL-MCNC: 154 MG/DL (ref 65–99)
HBV CORE AB SERPL QL IA: NEGATIVE
HBV SURFACE AB SER QL: NON REACTIVE
HBV SURFACE AG SERPL QL IA: NEGATIVE
HCT VFR BLD AUTO: 40.4 % (ref 40.4–52.2)
HCV RNA SERPL NAA+PROBE-ACNC: NORMAL IU/ML
HCV RNA SERPL NAA+PROBE-LOG IU: 6.94 LOG10 IU/ML
HGB BLD-MCNC: 13.4 G/DL (ref 13.7–17.6)
IMM GRANULOCYTES # BLD: 0.03 10*3/MM3 (ref 0–0.03)
IMM GRANULOCYTES NFR BLD: 0.5 % (ref 0–0.5)
INR PPP: 1.06 (ref 0.9–1.1)
LYMPHOCYTES # BLD AUTO: 0.99 10*3/MM3 (ref 0.9–4.8)
LYMPHOCYTES NFR BLD AUTO: 15.6 % (ref 19.6–45.3)
MCH RBC QN AUTO: 28.7 PG (ref 27–32.7)
MCHC RBC AUTO-ENTMCNC: 33.2 G/DL (ref 32.6–36.4)
MCV RBC AUTO: 86.5 FL (ref 79.8–96.2)
MONOCYTES # BLD AUTO: 0.25 10*3/MM3 (ref 0.2–1.2)
MONOCYTES NFR BLD AUTO: 3.9 % (ref 5–12)
NEUTROPHILS # BLD AUTO: 4.99 10*3/MM3 (ref 1.9–8.1)
NEUTROPHILS NFR BLD AUTO: 78.9 % (ref 42.7–76)
PLATELET # BLD AUTO: 103 10*3/MM3 (ref 140–500)
POTASSIUM SERPL-SCNC: 4.2 MMOL/L (ref 3.5–5.2)
PROT SERPL-MCNC: 7.4 G/DL (ref 6–8.5)
PROTHROMBIN TIME: 13.4 SECONDS (ref 11.7–14.2)
RBC # BLD AUTO: 4.67 10*6/MM3 (ref 4.6–6)
SODIUM SERPL-SCNC: 141 MMOL/L (ref 136–145)
TEST INFORMATION: NORMAL
WBC # BLD AUTO: 6.33 10*3/MM3 (ref 4.5–10.7)

## 2017-08-21 NOTE — PROGRESS NOTES
Please submit PA for Epclusa x 12 weeks for G2 HCV (treatment naive without cirrhosis).    F/U labs and o/v as per protocol once pt begins therapy.  Thanks.

## 2017-08-22 ENCOUNTER — TELEPHONE (OUTPATIENT)
Dept: SPORTS MEDICINE | Facility: CLINIC | Age: 52
End: 2017-08-22

## 2017-08-22 DIAGNOSIS — M54.2 CERVICALGIA: Primary | ICD-10-CM

## 2017-08-22 NOTE — TELEPHONE ENCOUNTER
1.  Set up MRI of cervical spine  2.  Also set up with pain mgmt at the hospital ( they can do injections that can help)

## 2017-08-22 NOTE — TELEPHONE ENCOUNTER
Pt called stating his pain is getting worse and would like to know what to do. He couldn't tolerate steroids.

## 2017-08-24 ENCOUNTER — OFFICE VISIT (OUTPATIENT)
Dept: SPORTS MEDICINE | Facility: CLINIC | Age: 52
End: 2017-08-24

## 2017-08-24 VITALS
OXYGEN SATURATION: 99 % | SYSTOLIC BLOOD PRESSURE: 120 MMHG | WEIGHT: 204 LBS | HEIGHT: 68 IN | BODY MASS INDEX: 30.92 KG/M2 | HEART RATE: 93 BPM | TEMPERATURE: 98.1 F | RESPIRATION RATE: 16 BRPM | DIASTOLIC BLOOD PRESSURE: 86 MMHG

## 2017-08-24 DIAGNOSIS — R50.9 FEVER, UNSPECIFIED FEVER CAUSE: ICD-10-CM

## 2017-08-24 DIAGNOSIS — R10.9 FLANK PAIN: Primary | ICD-10-CM

## 2017-08-24 LAB
BILIRUB BLD-MCNC: ABNORMAL MG/DL
CLARITY, POC: CLEAR
COLOR UR: ABNORMAL
EXPIRATION DATE: NORMAL
FLUAV AG NPH QL: NEGATIVE
FLUBV AG NPH QL: NEGATIVE
GLUCOSE UR STRIP-MCNC: NEGATIVE MG/DL
INTERNAL CONTROL: NORMAL
KETONES UR QL: NEGATIVE
LEUKOCYTE EST, POC: NEGATIVE
Lab: NORMAL
NITRITE UR-MCNC: NEGATIVE MG/ML
PH UR: 5.5 [PH] (ref 5–8)
PROT UR STRIP-MCNC: ABNORMAL MG/DL
RBC # UR STRIP: NEGATIVE /UL
SP GR UR: 1.02 (ref 1–1.03)
UROBILINOGEN UR QL: NORMAL

## 2017-08-24 PROCEDURE — 87804 INFLUENZA ASSAY W/OPTIC: CPT | Performed by: FAMILY MEDICINE

## 2017-08-24 PROCEDURE — 99214 OFFICE O/P EST MOD 30 MIN: CPT | Performed by: FAMILY MEDICINE

## 2017-08-24 PROCEDURE — 81003 URINALYSIS AUTO W/O SCOPE: CPT | Performed by: FAMILY MEDICINE

## 2017-08-24 NOTE — PROGRESS NOTES
"Bert is a 51 y.o. year old male    Chief Complaint   Patient presents with   • Fever     off and on for 4 days   • Chills   • Back Pain     low back pain       History of Present Illness   HPI   Fever/chills/myalgias started Monday. On and off. +night sweats. Assoc with aching flank pain. Some improvement with ibuprofen. Generalized, moderately severe.  Last night started having pain in the RUQ of the abdomen but resolved.     I have reviewed the patient's medical, family, and social history in detail and updated the computerized patient record.    Review of Systems   Constitutional: Positive for chills, fatigue and fever.   HENT: Negative.    Eyes: Negative.    Respiratory: Negative.    Cardiovascular: Negative.    Gastrointestinal: Positive for abdominal pain. Negative for constipation, diarrhea, nausea and vomiting.   Genitourinary: Positive for flank pain. Negative for dysuria and hematuria.   Musculoskeletal: Positive for myalgias.   Skin: Negative.    Neurological: Negative.    Psychiatric/Behavioral: Negative.        /86 (BP Location: Left arm, Patient Position: Sitting, Cuff Size: Adult)  Pulse 93  Temp 98.1 °F (36.7 °C) (Oral)   Resp 16  Ht 68\" (172.7 cm)  Wt 204 lb (92.5 kg)  SpO2 99%  BMI 31.02 kg/m2     Physical Exam   Constitutional: He is oriented to person, place, and time. He appears well-developed and well-nourished. He appears ill. No distress.   HENT:   Head: Normocephalic and atraumatic.   Mouth/Throat: Oropharynx is clear and moist. No oropharyngeal exudate.   Eyes: Conjunctivae are normal. Pupils are equal, round, and reactive to light.   Neck: Normal range of motion. No thyromegaly present.   Cardiovascular: Normal rate, regular rhythm and normal heart sounds.    Pulmonary/Chest: Effort normal and breath sounds normal. No respiratory distress. He has no wheezes. He has no rales.   Abdominal: Soft. Bowel sounds are normal. He exhibits no distension and no mass. There is no " tenderness. There is no rebound and no guarding.   Genitourinary: Prostate normal.   Lymphadenopathy:     He has no cervical adenopathy.   Neurological: He is alert and oriented to person, place, and time.   Skin: Skin is warm and dry. He is not diaphoretic.   There are a few erythematous papules on the right forearm, he states these seemed like mosquito bites or poison ivy and were very itchy a few days ago and are resolving   Psychiatric: He has a normal mood and affect.   Vitals reviewed.       Diagnoses and all orders for this visit:    Flank pain  -     POCT urinalysis dipstick, automated  -     CBC & Differential  -     Urinalysis With Microscopic  -     Urine Culture  -     Comprehensive Metabolic Panel  -     PSA  -     Lactate Dehydrogenase  -     C-reactive Protein  -     Sedimentation Rate  -     POC Influenza A / B  -     Mononucleosis Screen    Fever, unspecified fever cause  -     CBC & Differential  -     Urinalysis With Microscopic  -     Urine Culture  -     Comprehensive Metabolic Panel  -     PSA  -     Lactate Dehydrogenase  -     C-reactive Protein  -     Sedimentation Rate  -     POC Influenza A / B  -     Mononucleosis Screen    In office urinalysis reviewed, positive urobilinogen and protein.  Follow-up with for analysis.  Flu negative.  Suspicious for some systemic infectious process at hand.  We'll follow-up based on his lab results.  Consider CT scan of the abdomen and pelvis if labs are unrevealing

## 2017-08-26 LAB
ALBUMIN SERPL-MCNC: 4.2 G/DL (ref 3.5–5.2)
ALBUMIN/GLOB SERPL: 1.3 G/DL
ALP SERPL-CCNC: 74 U/L (ref 39–117)
ALT SERPL-CCNC: 112 U/L (ref 1–41)
APPEARANCE UR: CLEAR
AST SERPL-CCNC: 75 U/L (ref 1–40)
BACTERIA #/AREA URNS HPF: ABNORMAL /HPF
BACTERIA UR CULT: NO GROWTH
BACTERIA UR CULT: NORMAL
BASOPHILS # BLD AUTO: 0.16 10*3/MM3 (ref 0–0.2)
BASOPHILS NFR BLD AUTO: 2 % (ref 0–1.5)
BILIRUB SERPL-MCNC: 1.4 MG/DL (ref 0.1–1.2)
BILIRUB UR QL STRIP: NEGATIVE
BUN SERPL-MCNC: 15 MG/DL (ref 6–20)
BUN/CREAT SERPL: 14.7 (ref 7–25)
CALCIUM SERPL-MCNC: 9 MG/DL (ref 8.6–10.5)
CASTS URNS MICRO: ABNORMAL
CHLORIDE SERPL-SCNC: 99 MMOL/L (ref 98–107)
CO2 SERPL-SCNC: 26.8 MMOL/L (ref 22–29)
COLOR UR: (no result)
CREAT SERPL-MCNC: 1.02 MG/DL (ref 0.76–1.27)
CRP SERPL-MCNC: 3.77 MG/DL (ref 0–0.5)
DIFFERENTIAL COMMENT: NORMAL
EOSINOPHIL # BLD AUTO: 0.08 10*3/MM3 (ref 0–0.7)
EOSINOPHIL NFR BLD AUTO: 1 % (ref 0.3–6.2)
EPI CELLS #/AREA URNS HPF: ABNORMAL /HPF
ERYTHROCYTE [DISTWIDTH] IN BLOOD BY AUTOMATED COUNT: 13.8 % (ref 11.5–14.5)
ERYTHROCYTE [SEDIMENTATION RATE] IN BLOOD BY WESTERGREN METHOD: 13 MM/HR (ref 0–20)
GLOBULIN SER CALC-MCNC: 3.3 GM/DL
GLUCOSE SERPL-MCNC: 125 MG/DL (ref 65–99)
GLUCOSE UR QL: NEGATIVE
HCT VFR BLD AUTO: 42.2 % (ref 40.4–52.2)
HETEROPH AB SER QL LA: NEGATIVE
HGB BLD-MCNC: 14.2 G/DL (ref 13.7–17.6)
HGB UR QL STRIP: NEGATIVE
IMM GRANULOCYTES # BLD: 0 10*3/MM3 (ref 0–0.03)
IMM GRANULOCYTES NFR BLD: 0 % (ref 0–0.5)
KETONES UR QL STRIP: NEGATIVE
LDH SERPL-CCNC: 525 U/L (ref 135–225)
LEUKOCYTE ESTERASE UR QL STRIP: NEGATIVE
LYMPHOCYTES # BLD AUTO: 4.82 10*3/MM3 (ref 0.9–4.8)
LYMPHOCYTES NFR BLD AUTO: 60.4 % (ref 19.6–45.3)
MCH RBC QN AUTO: 29.3 PG (ref 27–32.7)
MCHC RBC AUTO-ENTMCNC: 33.6 G/DL (ref 32.6–36.4)
MCV RBC AUTO: 87.2 FL (ref 79.8–96.2)
MONOCYTES # BLD AUTO: 0.92 10*3/MM3 (ref 0.2–1.2)
MONOCYTES NFR BLD AUTO: 11.5 % (ref 5–12)
NEUTROPHILS # BLD AUTO: 2 10*3/MM3 (ref 1.9–8.1)
NEUTROPHILS NFR BLD AUTO: 25.1 % (ref 42.7–76)
NITRITE UR QL STRIP: NEGATIVE
PH UR STRIP: 5.5 [PH] (ref 5–8)
PLATELET # BLD AUTO: 71 10*3/MM3 (ref 140–500)
PLATELET BLD QL SMEAR: NORMAL
POTASSIUM SERPL-SCNC: 3.7 MMOL/L (ref 3.5–5.2)
PROT SERPL-MCNC: 7.5 G/DL (ref 6–8.5)
PROT UR QL STRIP: (no result)
PSA SERPL-MCNC: 3.22 NG/ML (ref 0–4)
RBC # BLD AUTO: 4.84 10*6/MM3 (ref 4.6–6)
RBC #/AREA URNS HPF: ABNORMAL /HPF
RBC MORPH BLD: NORMAL
SODIUM SERPL-SCNC: 141 MMOL/L (ref 136–145)
SP GR UR: 1.03 (ref 1–1.03)
UROBILINOGEN UR STRIP-MCNC: (no result) MG/DL
WBC # BLD AUTO: 7.98 10*3/MM3 (ref 4.5–10.7)
WBC #/AREA URNS HPF: ABNORMAL /HPF

## 2017-08-28 ENCOUNTER — TELEPHONE (OUTPATIENT)
Dept: SPORTS MEDICINE | Facility: CLINIC | Age: 52
End: 2017-08-28

## 2017-08-28 ENCOUNTER — HOSPITAL ENCOUNTER (OUTPATIENT)
Dept: MRI IMAGING | Facility: HOSPITAL | Age: 52
Discharge: HOME | End: 2017-08-28

## 2017-08-28 NOTE — TELEPHONE ENCOUNTER
Patients wife is calling saying he has had a headache for several days and was wondering if you would order an MRI or something for his head. He is currently at [facility] being admitted.

## 2017-08-29 ENCOUNTER — TELEPHONE (OUTPATIENT)
Dept: GASTROENTEROLOGY | Facility: CLINIC | Age: 52
End: 2017-08-29

## 2017-08-29 NOTE — TELEPHONE ENCOUNTER
Veronica DENTON paperwork sent to [facility]. Patient called and notified. Patient verbalized understanding and is agreeable to the plan.

## 2017-08-29 NOTE — TELEPHONE ENCOUNTER
----- Message from Henry Monae MD sent at 8/21/2017 11:05 AM EDT -----  Please submit PA for Epclusa x 12 weeks for G2 HCV (treatment naive without cirrhosis).    F/U labs and o/v as per protocol once pt begins therapy.  Thanks.

## 2017-08-30 ENCOUNTER — TRANSCRIBE ORDERS (OUTPATIENT)
Dept: PAIN MEDICINE | Facility: HOSPITAL | Age: 52
End: 2017-08-30

## 2017-08-30 ENCOUNTER — TELEPHONE (OUTPATIENT)
Dept: GASTROENTEROLOGY | Facility: CLINIC | Age: 52
End: 2017-08-30

## 2017-08-30 ENCOUNTER — HOSPITAL ENCOUNTER (OUTPATIENT)
Dept: PAIN MEDICINE | Facility: HOSPITAL | Age: 52
Discharge: HOME | End: 2017-08-30
Admitting: FAMILY MEDICINE

## 2017-08-30 ENCOUNTER — ANESTHESIA EVENT (OUTPATIENT)
Dept: PAIN MEDICINE | Facility: HOSPITAL | Age: 52
End: 2017-08-30

## 2017-08-30 ENCOUNTER — HOSPITAL ENCOUNTER (OUTPATIENT)
Dept: GENERAL RADIOLOGY | Facility: HOSPITAL | Age: 52
Discharge: HOME | End: 2017-08-30

## 2017-08-30 ENCOUNTER — ANESTHESIA (OUTPATIENT)
Dept: PAIN MEDICINE | Facility: HOSPITAL | Age: 52
End: 2017-08-30

## 2017-08-30 VITALS
TEMPERATURE: 97.8 F | RESPIRATION RATE: 16 BRPM | OXYGEN SATURATION: 98 % | SYSTOLIC BLOOD PRESSURE: 136 MMHG | BODY MASS INDEX: 30.31 KG/M2 | DIASTOLIC BLOOD PRESSURE: 72 MMHG | HEIGHT: 68 IN | WEIGHT: 200 LBS | HEART RATE: 97 BPM

## 2017-08-30 DIAGNOSIS — M54.2 CERVICALGIA: Primary | ICD-10-CM

## 2017-08-30 DIAGNOSIS — R52 PAIN: ICD-10-CM

## 2017-08-30 PROCEDURE — 77003 FLUOROGUIDE FOR SPINE INJECT: CPT

## 2017-08-30 PROCEDURE — 25010000002 FENTANYL CITRATE (PF) 100 MCG/2ML SOLUTION: Performed by: ANESTHESIOLOGY

## 2017-08-30 PROCEDURE — 25010000002 MIDAZOLAM PER 1 MG: Performed by: ANESTHESIOLOGY

## 2017-08-30 PROCEDURE — 25010000002 KETOROLAC TROMETHAMINE PER 15 MG: Performed by: ANESTHESIOLOGY

## 2017-08-30 PROCEDURE — C1755 CATHETER, INTRASPINAL: HCPCS

## 2017-08-30 PROCEDURE — 25010000002 METHYLPREDNISOLONE PER 80 MG: Performed by: ANESTHESIOLOGY

## 2017-08-30 RX ORDER — FENTANYL CITRATE 50 UG/ML
25 INJECTION, SOLUTION INTRAMUSCULAR; INTRAVENOUS
Status: DISCONTINUED | OUTPATIENT
Start: 2017-08-30 | End: 2017-08-31 | Stop reason: HOSPADM

## 2017-08-30 RX ORDER — METHYLPREDNISOLONE ACETATE 80 MG/ML
80 INJECTION, SUSPENSION INTRA-ARTICULAR; INTRALESIONAL; INTRAMUSCULAR; SOFT TISSUE ONCE
Status: COMPLETED | OUTPATIENT
Start: 2017-08-30 | End: 2017-08-30

## 2017-08-30 RX ORDER — SODIUM CHLORIDE 0.9 % (FLUSH) 0.9 %
1-10 SYRINGE (ML) INJECTION AS NEEDED
Status: DISCONTINUED | OUTPATIENT
Start: 2017-08-30 | End: 2017-08-31 | Stop reason: HOSPADM

## 2017-08-30 RX ORDER — MIDAZOLAM HYDROCHLORIDE 1 MG/ML
1 INJECTION INTRAMUSCULAR; INTRAVENOUS
Status: DISCONTINUED | OUTPATIENT
Start: 2017-08-30 | End: 2017-08-31 | Stop reason: HOSPADM

## 2017-08-30 RX ORDER — KETOROLAC TROMETHAMINE 30 MG/ML
30 INJECTION, SOLUTION INTRAMUSCULAR; INTRAVENOUS ONCE
Status: COMPLETED | OUTPATIENT
Start: 2017-08-30 | End: 2017-08-30

## 2017-08-30 RX ORDER — FENTANYL CITRATE 50 UG/ML
50 INJECTION, SOLUTION INTRAMUSCULAR; INTRAVENOUS
Status: DISCONTINUED | OUTPATIENT
Start: 2017-08-30 | End: 2017-08-31 | Stop reason: HOSPADM

## 2017-08-30 RX ORDER — LIDOCAINE HYDROCHLORIDE 10 MG/ML
1 INJECTION, SOLUTION INFILTRATION; PERINEURAL ONCE
Status: DISCONTINUED | OUTPATIENT
Start: 2017-08-30 | End: 2017-08-31 | Stop reason: HOSPADM

## 2017-08-30 RX ORDER — MIDAZOLAM HYDROCHLORIDE 1 MG/ML
1 INJECTION INTRAMUSCULAR; INTRAVENOUS ONCE
Status: COMPLETED | OUTPATIENT
Start: 2017-08-30 | End: 2017-08-30

## 2017-08-30 RX ADMIN — KETOROLAC TROMETHAMINE 30 MG: 30 INJECTION, SOLUTION INTRAMUSCULAR; INTRAVENOUS at 09:07

## 2017-08-30 RX ADMIN — FENTANYL CITRATE 50 MCG: 50 INJECTION INTRAMUSCULAR; INTRAVENOUS at 09:24

## 2017-08-30 RX ADMIN — MIDAZOLAM 1 MG: 1 INJECTION INTRAMUSCULAR; INTRAVENOUS at 09:23

## 2017-08-30 RX ADMIN — MIDAZOLAM 1 MG: 1 INJECTION INTRAMUSCULAR; INTRAVENOUS at 09:24

## 2017-08-30 RX ADMIN — METHYLPREDNISOLONE ACETATE 80 MG: 80 INJECTION, SUSPENSION INTRA-ARTICULAR; INTRALESIONAL; INTRAMUSCULAR; SOFT TISSUE at 09:29

## 2017-08-30 RX ADMIN — FENTANYL CITRATE 25 MCG: 50 INJECTION INTRAMUSCULAR; INTRAVENOUS at 09:25

## 2017-08-30 NOTE — TELEPHONE ENCOUNTER
Returned [facility]'s phone call. Questions regarding patient's prescription answered.

## 2017-08-30 NOTE — TELEPHONE ENCOUNTER
----- Message from Arnold Dubon sent at 8/30/2017 11:14 AM EDT -----  Regarding: RX  Contact: [phone]  ALLIE POLLARD [facility] IS FOLLOWING UP ON RX

## 2017-08-30 NOTE — ANESTHESIA PROCEDURE NOTES
PAIN Epidural block    Patient location during procedure: pain clinic  Start Time: 8/30/2017 9:21 AM  Stop Time: 8/30/2017 9:29 AM  Indication:at surgeon's request and procedure for pain  Performed By  Anesthesiologist: JESSICA HERMAN  Preanesthetic Checklist  Completed: patient identified, site marked, surgical consent, pre-op evaluation, timeout performed, IV checked, risks and benefits discussed and monitors and equipment checked  Additional Notes  Dx : Post-Op Diagnosis Codes:     * Cervical disc displacement (M50.20)     * Cervical spinal stenosis (M48.02)  80 mg depomedrol in epid  Prep:  Pt Position:prone (prone)  Sterile Tech:cap, gloves, mask and sterile barrier  Prep:chlorhexidine gluconate and isopropyl alcohol  Monitoring:blood pressure monitoring, continuous pulse oximetry and EKG  Procedure:  Approach:midline  Guidance: fluoroscopy and c arm pa and lat and loss of resistance  Location:cervical  Level:7-8 (interlaminar     C7T1)  Needle Type:Sentence Labtead  Needle Gauge:20  Aspiration:negative  Medications:  Depomedrol:80 mg  Preservative Free Saline:3mL    Post Assessment:  Post-procedure: bandaid.  Pt Tolerance:patient tolerated the procedure well with no apparent complications  Complications:no

## 2017-09-06 ENCOUNTER — ANESTHESIA (OUTPATIENT)
Dept: PAIN MEDICINE | Facility: HOSPITAL | Age: 52
End: 2017-09-06

## 2017-09-06 ENCOUNTER — ANESTHESIA EVENT (OUTPATIENT)
Dept: PAIN MEDICINE | Facility: HOSPITAL | Age: 52
End: 2017-09-06

## 2017-09-06 ENCOUNTER — HOSPITAL ENCOUNTER (OUTPATIENT)
Dept: PAIN MEDICINE | Facility: HOSPITAL | Age: 52
Discharge: HOME | End: 2017-09-06
Admitting: FAMILY MEDICINE

## 2017-09-06 ENCOUNTER — HOSPITAL ENCOUNTER (OUTPATIENT)
Dept: GENERAL RADIOLOGY | Facility: HOSPITAL | Age: 52
Discharge: HOME | End: 2017-09-06

## 2017-09-06 VITALS
HEART RATE: 94 BPM | SYSTOLIC BLOOD PRESSURE: 121 MMHG | DIASTOLIC BLOOD PRESSURE: 82 MMHG | TEMPERATURE: 97.8 F | WEIGHT: 200 LBS | RESPIRATION RATE: 16 BRPM | HEIGHT: 68 IN | OXYGEN SATURATION: 97 % | BODY MASS INDEX: 30.31 KG/M2

## 2017-09-06 DIAGNOSIS — R52 PAIN: ICD-10-CM

## 2017-09-06 PROCEDURE — 25010000002 MIDAZOLAM PER 1 MG: Performed by: ANESTHESIOLOGY

## 2017-09-06 PROCEDURE — C1755 CATHETER, INTRASPINAL: HCPCS

## 2017-09-06 PROCEDURE — 25010000002 FENTANYL CITRATE (PF) 100 MCG/2ML SOLUTION: Performed by: ANESTHESIOLOGY

## 2017-09-06 PROCEDURE — 0 IOPAMIDOL 41 % SOLUTION: Performed by: ANESTHESIOLOGY

## 2017-09-06 PROCEDURE — 77003 FLUOROGUIDE FOR SPINE INJECT: CPT

## 2017-09-06 PROCEDURE — 25010000002 METHYLPREDNISOLONE PER 80 MG: Performed by: ANESTHESIOLOGY

## 2017-09-06 RX ORDER — SODIUM CHLORIDE 0.9 % (FLUSH) 0.9 %
1-10 SYRINGE (ML) INJECTION AS NEEDED
Status: DISCONTINUED | OUTPATIENT
Start: 2017-09-06 | End: 2017-09-07 | Stop reason: HOSPADM

## 2017-09-06 RX ORDER — FENTANYL CITRATE 50 UG/ML
50 INJECTION, SOLUTION INTRAMUSCULAR; INTRAVENOUS AS NEEDED
Status: DISCONTINUED | OUTPATIENT
Start: 2017-09-06 | End: 2017-09-07 | Stop reason: HOSPADM

## 2017-09-06 RX ORDER — LIDOCAINE HYDROCHLORIDE 10 MG/ML
1 INJECTION, SOLUTION INFILTRATION; PERINEURAL ONCE
Status: DISCONTINUED | OUTPATIENT
Start: 2017-09-06 | End: 2017-09-07 | Stop reason: HOSPADM

## 2017-09-06 RX ORDER — METHYLPREDNISOLONE ACETATE 80 MG/ML
80 INJECTION, SUSPENSION INTRA-ARTICULAR; INTRALESIONAL; INTRAMUSCULAR; SOFT TISSUE ONCE
Status: COMPLETED | OUTPATIENT
Start: 2017-09-06 | End: 2017-09-06

## 2017-09-06 RX ORDER — MIDAZOLAM HYDROCHLORIDE 1 MG/ML
1 INJECTION INTRAMUSCULAR; INTRAVENOUS AS NEEDED
Status: DISCONTINUED | OUTPATIENT
Start: 2017-09-06 | End: 2017-09-07 | Stop reason: HOSPADM

## 2017-09-06 RX ADMIN — FENTANYL CITRATE 50 MCG: 50 INJECTION INTRAMUSCULAR; INTRAVENOUS at 13:12

## 2017-09-06 RX ADMIN — IOPAMIDOL 10 ML: 408 INJECTION, SOLUTION INTRATHECAL at 13:16

## 2017-09-06 RX ADMIN — METHYLPREDNISOLONE ACETATE 80 MG: 80 INJECTION, SUSPENSION INTRA-ARTICULAR; INTRALESIONAL; INTRAMUSCULAR; SOFT TISSUE at 13:16

## 2017-09-06 RX ADMIN — MIDAZOLAM 2 MG: 1 INJECTION INTRAMUSCULAR; INTRAVENOUS at 13:12

## 2017-09-06 NOTE — ANESTHESIA PROCEDURE NOTES
PAIN Epidural block    Patient location during procedure: pain clinic  Indication:procedure for pain  Performed By  Anesthesiologist: LAQUITA ERAZO  Preanesthetic Checklist  Completed: patient identified, site marked, surgical consent, pre-op evaluation, timeout performed, risks and benefits discussed and monitors and equipment checked  Additional Notes  Depomedrol - 80mg    Needle position confirmed by fluoroscopy and epidurogram using 2cc of [id number].    Diagnosis   Post-Op Diagnosis Codes:     * Cervical radiculopathy (M54.12)     * Cervical disc disorder (M50.90)      Prep:  Pt Position:prone  Sterile Tech:cap, gloves, mask and sterile barrier  Prep:chlorhexidine gluconate and isopropyl alcohol  Monitoring:blood pressure monitoring, continuous pulse oximetry and EKG  Procedure:  Approach:midline  Guidance: fluoroscopy  Location:cervical  Level:5-6  Needle Type:Tuohy  Needle Gauge:20  Aspiration:negative  Medications:  Depomedrol:80 mg  Preservative Free Saline:2mL  Isovue:2mL  Comments:Needle position confirmed by fluoroscopy and epidurogram using 2cc of [id number].  Post Assessment:  Post-procedure: bandaid.  Pt Tolerance:patient tolerated the procedure well with no apparent complications  Complications:no

## 2017-09-06 NOTE — INTERVAL H&P NOTE
[facility]  H&P reviewed. No changes since last visit.  Patient states   25-50% improvement since the last procedure/injection.    Diagnosis     * Cervical radiculopathy [M54.12]      Airway assessed since last visit. Airway class equals: 2.

## 2017-09-06 NOTE — H&P (VIEW-ONLY)
[facility]    History and Physical    Patient Name: Bert Addison  :  1965  MRN:  [id number]  Date of Admission: 2017    Subjective     Patient is a 51 y.o. male presents with chief complaint of chronic, constant, excruciating, severe, aching, stabbing, 7-10 / 10 neck, shoulder: bilateral and upper back pain and headaches.  Onset of symptoms was gradual starting 7 years ago.  Symptoms are associated/aggravated by stress and looking down. Symptoms improve with nothing    The following portions of the patients history were reviewed and updated as appropriate: current medications, allergies, past medical history, past surgical history, past family history, past social history and problem list                Objective     Past Medical History:   Past Medical History:   Diagnosis Date   • 3-vessel CAD    • Chest pain    • Headache, tension-type    • Hepatitis C    • Hyperlipidemia    • Impingement syndrome of right shoulder    • Migraine    • Myalgia    • Sleep apnea      Past Surgical History:   Past Surgical History:   Procedure Laterality Date   • COLONOSCOPY N/A 2017    lipomatous ileocecal valve, diverticulosis in sigmoid colon   • CORONARY ANGIOPLASTY WITH STENT PLACEMENT      Drug-eluting stent to the proximal ramus branch.  Drug-eluting stent to the mid right coronary artery.  2015.   • HERNIA REPAIR       Family History:   Family History   Problem Relation Age of Onset   • Heart disease Mother    • Heart attack Mother    • Other Mother      Pacemaker   • Heart disease Father    • Hypertension Sister    • Heart attack Sister    • Heart attack Brother      Social History:   Social History   Substance Use Topics   • Smoking status: Former Smoker     Packs/day: 1.00     Years: 30.00     Types: Cigarettes     Quit date:    • Smokeless tobacco: Never Used   • Alcohol use No      Comment: daily caffeine use       Vital Signs Range for the last 24 hours  Temperature:     Temp  Source:     BP: BP: ()/()    Pulse:     Respirations:     SPO2:     O2 Amount (l/min):     O2 Devices     Weight:           --------------------------------------------------------------------------------    Current Outpatient Prescriptions   Medication Sig Dispense Refill   • aspirin 81 MG tablet Take  by mouth.     • LIVALO 2 MG tablet tablet TAKE 1/2 TABLET BY MOUTH EVERY DAY 15 tablet 0   • PENNSAID 2 % solution Apply 1-2 pumps to affected area BID prn pain 112 g 3   • TiZANidine (ZANAFLEX) 4 MG capsule 1/2 to 1 tablet q 8 h prn 30 capsule 0     Current Facility-Administered Medications   Medication Dose Route Frequency Provider Last Rate Last Dose   • fentaNYL citrate (PF) (SUBLIMAZE) injection 50 mcg  50 mcg Intravenous Q5 Min PRN Lee Hector MD       • iopamidol (ISOVUE-M 200) injection 41%  2 mL Injection Once in imaging Lee Hector MD       • lidocaine (XYLOCAINE) 1 % injection 1 mL  1 mL Intradermal Once Lee Hector MD       • methylPREDNISolone acetate (DEPO-medrol) injection 80 mg  80 mg Intramuscular Once Lee Hector MD       • midazolam (VERSED) injection 1 mg  1 mg Intravenous Q5 Min PRN Lee Hector MD       • sodium chloride 0.9 % flush 1-10 mL  1-10 mL Intravenous PRN Lee Hector MD           --------------------------------------------------------------------------------  Assessment/Plan      Anesthesia Evaluation     Patient summary reviewed and Nursing notes reviewed          Airway   Mallampati: II  Dental - normal exam     Pulmonary - normal exam   (+) sleep apnea,   Cardiovascular - normal exam    (+) CAD,       Neuro/Psych- negative ROS and neuro exam normal  GI/Hepatic/Renal/Endo    (+)  hepatitis,     Musculoskeletal (-) normal exam    (+) neck pain,   Abdominal  - normal exam   Substance History - negative use     OB/GYN negative ob/gyn ROS         Other            Phys Exam Other: In severe pain; can't even sit still   Told him we can't do the procedure safely  without sedation and him being able to hold still  He will try to get a                            Diagnosis and Plan    Treatment Plan  ASA 3      Procedures: Cervical Epidural Steroid Injection(EVELIN), With fluoroscopy,       Anesthetic plan and risks discussed with patient.          Diagnosis     * Cervical disc displacement [M50.20]     * Cervical spinal stenosis [M48.02]

## 2017-09-13 ENCOUNTER — TELEPHONE (OUTPATIENT)
Dept: GASTROENTEROLOGY | Facility: CLINIC | Age: 52
End: 2017-09-13

## 2017-09-13 DIAGNOSIS — B18.2 CHRONIC HEPATITIS C WITHOUT HEPATIC COMA (HCC): Primary | ICD-10-CM

## 2017-09-15 RX ORDER — PITAVASTATIN CALCIUM 2.09 MG/1
TABLET, FILM COATED ORAL
Qty: 15 TABLET | Refills: 0 | Status: SHIPPED | OUTPATIENT
Start: 2017-09-15 | End: 2017-10-13 | Stop reason: SDUPTHER

## 2017-09-27 ENCOUNTER — TELEPHONE (OUTPATIENT)
Dept: SPORTS MEDICINE | Facility: CLINIC | Age: 52
End: 2017-09-27

## 2017-09-27 DIAGNOSIS — M48.02 STENOSIS OF CERVICAL SPINE: Primary | ICD-10-CM

## 2017-09-28 NOTE — TELEPHONE ENCOUNTER
Called and spoke with Dr. Hui office, they are able to get him in on Monday with Dr. Alexander. Patient has been notified.

## 2017-10-02 ENCOUNTER — OFFICE VISIT (OUTPATIENT)
Dept: NEUROSURGERY | Facility: CLINIC | Age: 52
End: 2017-10-02

## 2017-10-02 VITALS
HEIGHT: 68 IN | WEIGHT: 194 LBS | DIASTOLIC BLOOD PRESSURE: 88 MMHG | SYSTOLIC BLOOD PRESSURE: 130 MMHG | BODY MASS INDEX: 29.4 KG/M2

## 2017-10-02 DIAGNOSIS — M54.2 CERVICALGIA: Primary | ICD-10-CM

## 2017-10-02 PROCEDURE — 99243 OFF/OP CNSLTJ NEW/EST LOW 30: CPT | Performed by: NEUROLOGICAL SURGERY

## 2017-10-02 RX ORDER — ACETAMINOPHEN, ASPIRIN AND CAFFEINE 250; 250; 65 MG/1; MG/1; MG/1
1 TABLET, FILM COATED ORAL EVERY 6 HOURS PRN
COMMUNITY
End: 2018-04-19

## 2017-10-02 RX ORDER — VELPATASVIR AND SOFOSBUVIR 100; 400 MG/1; MG/1
TABLET, FILM COATED ORAL
Refills: 2 | COMMUNITY
Start: 2017-09-28 | End: 2018-04-19

## 2017-10-02 RX ORDER — IBUPROFEN 800 MG/1
1000 TABLET ORAL EVERY 6 HOURS PRN
COMMUNITY
End: 2018-02-09

## 2017-10-02 NOTE — PROGRESS NOTES
Subjective   Patient ID: Bert Addison is a 52 y.o. male who is being seen for consultation today at the request of Alfredo Mast MD for neck pain. He had a cervical MRI at [facility] on 8/28/17. He presents unaccompanied.     History of Present Illness Patient is s/p cardiac stenting. He has a history of Hepatitis C related to vaccination while in the [employer].  He is 1 month into his treatment for this. Patient presents today for left sided shoulder pain.  This started with years of neck pain with 2 months of left sided shoulder region pain.  He had pain related to a gold outing in august.  He reprots headaches and pain in the sided of his muscle.  He is a manager of factories.  Static positions aggravate.  He is taking 1000mg  Ibuprofen 1x per week.  He does not feel this helps.  Patient reports DASH x 2 recently.  These are partially effective.  Pain is rated 5/10 and is in the trapezius on the left.      The following portions of the patient's history were reviewed and updated as appropriate: allergies, current medications, past family history, past medical history, past social history, past surgical history and problem list.    Review of Systems   Constitutional: Negative for chills and fever.   Respiratory: Negative for cough and shortness of breath.    Cardiovascular: Negative for chest pain, palpitations and leg swelling.   Gastrointestinal: Negative for abdominal pain and constipation.   Genitourinary: Negative for difficulty urinating and enuresis.   Musculoskeletal: Positive for neck pain. Negative for gait problem.   Skin: Negative for rash.   Neurological: Negative for weakness and numbness (or tingling).   Hematological: Does not bruise/bleed easily.   Psychiatric/Behavioral: Negative for sleep disturbance.       Objective   Physical Exam   Constitutional: He is oriented to person, place, and time.   Neurological: He is oriented to person, place, and time. Gait normal.   Reflex Scores:        Tricep reflexes are 1+ on the right side and 1+ on the left side.       Bicep reflexes are 1+ on the right side and 1+ on the left side.       Brachioradialis reflexes are 1+ on the right side and 1+ on the left side.       Patellar reflexes are 1+ on the right side and 1+ on the left side.       Achilles reflexes are 1+ on the right side and 1+ on the left side.    Neurologic Exam     Mental Status   Oriented to person, place, and time.     Motor Exam   Muscle bulk: normal  Overall muscle tone: normal    Strength   Right deltoid: 5/5  Left deltoid: 5/5  Right biceps: 5/5  Left biceps: 5/5  Right triceps: 5/5  Left triceps: 5/5  Right wrist flexion: 5/5  Left wrist flexion: 5/5  Right wrist extension: 5/5  Left wrist extension: 5/5  Right interossei: 5/5  Left interossei: 5/5  Right iliopsoas: 5/5  Left iliopsoas: 5/5  Right quadriceps: 5/5  Left quadriceps: 5/5  Right hamstrin/5  Left hamstrin/5  Right anterior tibial: 5/5  Left anterior tibial: 5/5  Right gastroc: 5/5  Left gastroc: 5/5    Sensory Exam   Right arm light touch: normal  Left arm light touch: normal  Right leg light touch: normal  Left leg light touch: normal  Right arm pinprick: normal  Left arm pinprick: normal  Right leg pinprick: normal  Left leg pinprick: normal    Gait, Coordination, and Reflexes     Gait  Gait: normal    Reflexes   Right brachioradialis: 1+  Left brachioradialis: 1+  Right biceps: 1+  Left biceps: 1+  Right triceps: 1+  Left triceps: 1+  Right patellar: 1+  Left patellar: 1+  Right achilles: 1+  Left achilles: 1+  Right [place]: absent  Left [place]: absent  Right ankle clonus: absent  Left ankle clonus: absent      Assessment/Plan   Independent Review of Radiographic Studies:    Central HNP at c56.  No cord signal change.  Has some kyphosis centered at this level.    Medical Decision Making:  Patient with central HNP and no significant foraminal stenosis.  He has responded to being active.  I think he should schedule  NSAIDS and start PT.  He may respond to some anti-spasmodics.  We will start some diclofenac for 1 month and then he should start PT and I advise traction.  We discussed the cardiac risk of nsaids.  He is quite healthy overall. Surgery would be a last resort for neck pain.  We will refer to PT if he fails to respond.      Bert was seen today for neck pain.    Diagnoses and all orders for this visit:    Cervicalgia    Other orders  -     diclofenac (VOLTAREN) 50 MG EC tablet; Take 1 tablet by mouth 2 (Two) Times a Day.      No Follow-up on file.

## 2017-10-08 LAB
ALBUMIN SERPL-MCNC: 4.5 G/DL (ref 3.5–5.2)
ALBUMIN/GLOB SERPL: 1.3 G/DL
ALP SERPL-CCNC: 42 U/L (ref 39–117)
ALT SERPL-CCNC: 35 U/L (ref 1–41)
AST SERPL-CCNC: 27 U/L (ref 1–40)
BILIRUB SERPL-MCNC: 1 MG/DL (ref 0.1–1.2)
BUN SERPL-MCNC: 10 MG/DL (ref 6–20)
BUN/CREAT SERPL: 12.8 (ref 7–25)
CALCIUM SERPL-MCNC: 9.1 MG/DL (ref 8.6–10.5)
CHLORIDE SERPL-SCNC: 104 MMOL/L (ref 98–107)
CO2 SERPL-SCNC: 28.2 MMOL/L (ref 22–29)
CREAT SERPL-MCNC: 0.78 MG/DL (ref 0.76–1.27)
ERYTHROCYTE [DISTWIDTH] IN BLOOD BY AUTOMATED COUNT: 14.2 % (ref 11.5–14.5)
GLOBULIN SER CALC-MCNC: 3.4 GM/DL
GLUCOSE SERPL-MCNC: 129 MG/DL (ref 65–99)
HCT VFR BLD AUTO: 37 % (ref 40.4–52.2)
HCV RNA SERPL NAA+PROBE-ACNC: NORMAL IU/ML
HGB BLD-MCNC: 12.7 G/DL (ref 13.7–17.6)
MCH RBC QN AUTO: 29.7 PG (ref 27–32.7)
MCHC RBC AUTO-ENTMCNC: 34.3 G/DL (ref 32.6–36.4)
MCV RBC AUTO: 86.4 FL (ref 79.8–96.2)
PLATELET # BLD AUTO: 97 10*3/MM3 (ref 140–500)
POTASSIUM SERPL-SCNC: 4.2 MMOL/L (ref 3.5–5.2)
PROT SERPL-MCNC: 7.9 G/DL (ref 6–8.5)
RBC # BLD AUTO: 4.28 10*6/MM3 (ref 4.6–6)
SODIUM SERPL-SCNC: 143 MMOL/L (ref 136–145)
TEST INFORMATION: NORMAL
WBC # BLD AUTO: 4.31 10*3/MM3 (ref 4.5–10.7)

## 2017-10-09 ENCOUNTER — OFFICE VISIT (OUTPATIENT)
Dept: GASTROENTEROLOGY | Facility: CLINIC | Age: 52
End: 2017-10-09

## 2017-10-09 VITALS
BODY MASS INDEX: 29.37 KG/M2 | HEIGHT: 68 IN | SYSTOLIC BLOOD PRESSURE: 136 MMHG | TEMPERATURE: 98.1 F | DIASTOLIC BLOOD PRESSURE: 84 MMHG | WEIGHT: 193.8 LBS

## 2017-10-09 DIAGNOSIS — B18.2 CHRONIC HEPATITIS C WITHOUT HEPATIC COMA (HCC): Primary | ICD-10-CM

## 2017-10-09 DIAGNOSIS — D69.6 THROMBOCYTOPENIA (HCC): ICD-10-CM

## 2017-10-09 PROCEDURE — 99213 OFFICE O/P EST LOW 20 MIN: CPT | Performed by: INTERNAL MEDICINE

## 2017-10-09 NOTE — PROGRESS NOTES
Chief Complaint   Patient presents with   • Hepatic Disease     Subjective     HPI  Bert Addison is a 52 y.o. male who presents Today for follow-up.  Mr. Mann is now 4 weeks into therapy for his genotype 2 hepatitis C with occlusive which she seems to be tolerating quite well.  He's had a a few mild headaches but the denies nausea or abdominal pain.  He's had some occasional nocturnal muscle cramps but relates this to increase exercise activity over the last few weeks.  We discussed his 4 week viral load which has reassuringly returned undetectable.  His CBC does show a slight decrease in his white blood cell count and hemoglobin and no change in his baseline thrombocytopenia.    Past Medical History:   Diagnosis Date   • 3-vessel CAD    • Chest pain    • Headache, tension-type    • Hepatitis C    • Hyperlipidemia    • Impingement syndrome of right shoulder    • Migraine    • Myalgia    • Neck pain    • Sleep apnea        Social History     Social History   • Marital status:      Spouse name: N/A   • Number of children: N/A   • Years of education: N/A     Social History Main Topics   • Smoking status: Former Smoker     Packs/day: 1.00     Years: 30.00     Types: Cigarettes     Quit date: 2012   • Smokeless tobacco: Never Used   • Alcohol use No      Comment: daily caffeine use   • Drug use: No   • Sexual activity: Defer     Other Topics Concern   • None     Social History Narrative         Current Outpatient Prescriptions:   •  aspirin 81 MG tablet, Take  by mouth., Disp: , Rfl:   •  diclofenac (VOLTAREN) 50 MG EC tablet, Take 1 tablet by mouth 2 (Two) Times a Day., Disp: 65 tablet, Rfl: 2  •  EPCLUSA 400-100 MG tablet, TK ONE T PO DAILY, Disp: , Rfl: 2  •  LIVALO 2 MG tablet tablet, TAKE ONE HALF TABLET BY MOUTH EVERY DAY, Disp: 15 tablet, Rfl: 0  •  Unable to find, 1 each 1 (One) Time. Potassium OTC meq ?, Disp: , Rfl:   •  aspirin-acetaminophen-caffeine (EXCEDRIN MIGRAINE) 250-250-65 MG per tablet,  Take 1 tablet by mouth Every 6 (Six) Hours As Needed for Headache., Disp: , Rfl:   •  ibuprofen (ADVIL,MOTRIN) 800 MG tablet, Take 1,000 mg by mouth Every 6 (Six) Hours As Needed for Mild Pain ., Disp: , Rfl:   •  TiZANidine (ZANAFLEX) 4 MG capsule, 1/2 to 1 tablet q 8 h prn, Disp: 30 capsule, Rfl: 0    Review of Systems   Constitutional: Negative.    Respiratory: Negative.    Cardiovascular: Negative.    Gastrointestinal: Negative.    Neurological: Positive for headaches.       Objective   Vitals:    10/09/17 1433   BP: 136/84   Temp: 98.1 °F (36.7 °C)       Physical Exam   Constitutional: He is oriented to person, place, and time. He appears well-developed and well-nourished.   HENT:   Head: Normocephalic and atraumatic.   Abdominal: Soft. Bowel sounds are normal. He exhibits no distension and no mass. There is no tenderness. No hernia.   Neurological: He is alert and oriented to person, place, and time.   Skin: Skin is warm and dry.   Psychiatric: He has a normal mood and affect. His behavior is normal. Judgment and thought content normal.   Vitals reviewed.      Assessment/Plan   Assessment:     1. Chronic hepatitis C without hepatic coma    2. Pancytopenia      Plan:   Repeat CBC in 2 weeks to monitor leukopenia and thrombocytopenia  Continue Epclusa - 4 week viral load undetectable  Follow up office in 8 weeks         Henry Monae M.D.  [facility]  [address] [place] [address]  Office: [phone]

## 2017-10-16 RX ORDER — TIZANIDINE 4 MG/1
TABLET ORAL
Qty: 30 TABLET | Refills: 0 | Status: SHIPPED | OUTPATIENT
Start: 2017-10-16 | End: 2017-11-09 | Stop reason: SDUPTHER

## 2017-10-16 RX ORDER — PITAVASTATIN CALCIUM 2.09 MG/1
TABLET, FILM COATED ORAL
Qty: 15 TABLET | Refills: 0 | Status: SHIPPED | OUTPATIENT
Start: 2017-10-16 | End: 2017-11-09 | Stop reason: SDUPTHER

## 2017-10-25 ENCOUNTER — TELEPHONE (OUTPATIENT)
Dept: GASTROENTEROLOGY | Facility: CLINIC | Age: 52
End: 2017-10-25

## 2017-10-25 LAB
ALBUMIN SERPL-MCNC: 4.3 G/DL (ref 3.5–5.2)
ALBUMIN/GLOB SERPL: 1.3 G/DL
ALP SERPL-CCNC: 43 U/L (ref 39–117)
ALT SERPL-CCNC: 33 U/L (ref 1–41)
AST SERPL-CCNC: 31 U/L (ref 1–40)
BILIRUB SERPL-MCNC: 0.8 MG/DL (ref 0.1–1.2)
BUN SERPL-MCNC: 17 MG/DL (ref 6–20)
BUN/CREAT SERPL: 19.1 (ref 7–25)
CALCIUM SERPL-MCNC: 9.2 MG/DL (ref 8.6–10.5)
CHLORIDE SERPL-SCNC: 102 MMOL/L (ref 98–107)
CO2 SERPL-SCNC: 27.5 MMOL/L (ref 22–29)
CREAT SERPL-MCNC: 0.89 MG/DL (ref 0.76–1.27)
ERYTHROCYTE [DISTWIDTH] IN BLOOD BY AUTOMATED COUNT: 13.6 % (ref 11.5–14.5)
GFR SERPLBLD CREATININE-BSD FMLA CKD-EPI: 109 ML/MIN/1.73
GFR SERPLBLD CREATININE-BSD FMLA CKD-EPI: 90 ML/MIN/1.73
GLOBULIN SER CALC-MCNC: 3.3 GM/DL
GLUCOSE SERPL-MCNC: 113 MG/DL (ref 65–99)
HCT VFR BLD AUTO: 37.8 % (ref 40.4–52.2)
HCV RNA SERPL NAA+PROBE-ACNC: NORMAL IU/ML
HGB BLD-MCNC: 12.6 G/DL (ref 13.7–17.6)
MCH RBC QN AUTO: 29.7 PG (ref 27–32.7)
MCHC RBC AUTO-ENTMCNC: 33.3 G/DL (ref 32.6–36.4)
MCV RBC AUTO: 89.2 FL (ref 79.8–96.2)
PLATELET # BLD AUTO: 111 10*3/MM3 (ref 140–500)
POTASSIUM SERPL-SCNC: 4.1 MMOL/L (ref 3.5–5.2)
PROT SERPL-MCNC: 7.6 G/DL (ref 6–8.5)
RBC # BLD AUTO: 4.24 10*6/MM3 (ref 4.6–6)
SODIUM SERPL-SCNC: 142 MMOL/L (ref 136–145)
TEST INFORMATION: NORMAL
WBC # BLD AUTO: 7.02 10*3/MM3 (ref 4.5–10.7)

## 2017-10-25 NOTE — TELEPHONE ENCOUNTER
----- Message from Henry Monae MD sent at 10/25/2017 11:06 AM EDT -----  WBC and platelets much better.  Repeat CBC, CMP in 4 weeks

## 2017-10-27 ENCOUNTER — TELEPHONE (OUTPATIENT)
Dept: GASTROENTEROLOGY | Facility: CLINIC | Age: 52
End: 2017-10-27

## 2017-10-27 NOTE — TELEPHONE ENCOUNTER
----- Message from Arnold Dubon sent at 10/27/2017 10:02 AM EDT -----  Regarding: APPT   Contact: [phone]  PT CALLED ASKING IF HE NEEDS A FU AFTER LABS IN NOV

## 2017-11-02 LAB
ALBUMIN SERPL-MCNC: 3.9 G/DL (ref 3.5–5.2)
ALBUMIN/GLOB SERPL: 1.1 G/DL
ALP SERPL-CCNC: 41 U/L (ref 39–117)
ALT SERPL-CCNC: 31 U/L (ref 1–41)
AST SERPL-CCNC: 30 U/L (ref 1–40)
BILIRUB SERPL-MCNC: 0.9 MG/DL (ref 0.1–1.2)
BUN SERPL-MCNC: 16 MG/DL (ref 6–20)
BUN/CREAT SERPL: 22.2 (ref 7–25)
CALCIUM SERPL-MCNC: 8.9 MG/DL (ref 8.6–10.5)
CHLORIDE SERPL-SCNC: 103 MMOL/L (ref 98–107)
CO2 SERPL-SCNC: 26.4 MMOL/L (ref 22–29)
CREAT SERPL-MCNC: 0.72 MG/DL (ref 0.76–1.27)
ERYTHROCYTE [DISTWIDTH] IN BLOOD BY AUTOMATED COUNT: 13.6 % (ref 11.5–14.5)
GFR SERPLBLD CREATININE-BSD FMLA CKD-EPI: 115 ML/MIN/1.73
GFR SERPLBLD CREATININE-BSD FMLA CKD-EPI: 139 ML/MIN/1.73
GLOBULIN SER CALC-MCNC: 3.6 GM/DL
GLUCOSE SERPL-MCNC: 85 MG/DL (ref 65–99)
HCT VFR BLD AUTO: 37.4 % (ref 40.4–52.2)
HGB BLD-MCNC: 12.4 G/DL (ref 13.7–17.6)
MCH RBC QN AUTO: 29.2 PG (ref 27–32.7)
MCHC RBC AUTO-ENTMCNC: 33.2 G/DL (ref 32.6–36.4)
MCV RBC AUTO: 88.2 FL (ref 79.8–96.2)
PLATELET # BLD AUTO: 112 10*3/MM3 (ref 140–500)
POTASSIUM SERPL-SCNC: 4.2 MMOL/L (ref 3.5–5.2)
PROT SERPL-MCNC: 7.5 G/DL (ref 6–8.5)
RBC # BLD AUTO: 4.24 10*6/MM3 (ref 4.6–6)
SODIUM SERPL-SCNC: 141 MMOL/L (ref 136–145)
WBC # BLD AUTO: 4.56 10*3/MM3 (ref 4.5–10.7)

## 2017-11-09 RX ORDER — TIZANIDINE 4 MG/1
TABLET ORAL
Qty: 30 TABLET | Refills: 0 | OUTPATIENT
Start: 2017-11-09

## 2017-11-10 RX ORDER — PITAVASTATIN CALCIUM 2.09 MG/1
TABLET, FILM COATED ORAL
Qty: 15 TABLET | Refills: 0 | Status: SHIPPED | OUTPATIENT
Start: 2017-11-10 | End: 2017-12-19 | Stop reason: SDUPTHER

## 2017-11-10 RX ORDER — TIZANIDINE 4 MG/1
TABLET ORAL
Qty: 30 TABLET | Refills: 0 | Status: SHIPPED | OUTPATIENT
Start: 2017-11-10 | End: 2017-12-19 | Stop reason: SDUPTHER

## 2017-12-06 ENCOUNTER — TELEPHONE (OUTPATIENT)
Dept: GASTROENTEROLOGY | Facility: CLINIC | Age: 52
End: 2017-12-06

## 2017-12-11 ENCOUNTER — OFFICE VISIT (OUTPATIENT)
Dept: GASTROENTEROLOGY | Facility: CLINIC | Age: 52
End: 2017-12-11

## 2017-12-11 ENCOUNTER — TRANSCRIBE ORDERS (OUTPATIENT)
Dept: PAIN MEDICINE | Facility: HOSPITAL | Age: 52
End: 2017-12-11

## 2017-12-11 VITALS
TEMPERATURE: 97.6 F | BODY MASS INDEX: 29.55 KG/M2 | WEIGHT: 195 LBS | SYSTOLIC BLOOD PRESSURE: 110 MMHG | DIASTOLIC BLOOD PRESSURE: 70 MMHG | HEIGHT: 68 IN

## 2017-12-11 DIAGNOSIS — M54.2 CERVICALGIA: Primary | ICD-10-CM

## 2017-12-11 DIAGNOSIS — B18.2 CHRONIC HEPATITIS C WITHOUT HEPATIC COMA (HCC): Primary | ICD-10-CM

## 2017-12-11 PROCEDURE — 99213 OFFICE O/P EST LOW 20 MIN: CPT | Performed by: INTERNAL MEDICINE

## 2017-12-11 NOTE — PROGRESS NOTES
Chief Complaint   Patient presents with   • Follow-up     Chronic Hep C     Subjective     HPI     Bert Addison is a 52 y.o. male who presents today for follow up.  He has recently completed his 12 week course of Epclusa.  His most recent CBC shows stable thrombocytopenia, and CMP is normal.  He tolerated therapy quite well and is currently without symptoms.      Past Medical History:   Diagnosis Date   • 3-vessel CAD    • Chest pain    • Headache, tension-type    • Hepatitis C    • Hyperlipidemia    • Impingement syndrome of right shoulder    • Migraine    • Myalgia    • Neck pain    • Sleep apnea        Social History     Social History   • Marital status:      Spouse name: N/A   • Number of children: N/A   • Years of education: N/A     Social History Main Topics   • Smoking status: Former Smoker     Packs/day: 1.00     Years: 30.00     Types: Cigarettes     Quit date: 2012   • Smokeless tobacco: Never Used   • Alcohol use No      Comment: daily caffeine use   • Drug use: No   • Sexual activity: Defer     Other Topics Concern   • None     Social History Narrative       Current Outpatient Prescriptions:   •  aspirin-acetaminophen-caffeine (EXCEDRIN MIGRAINE) 250-250-65 MG per tablet, Take 1 tablet by mouth Every 6 (Six) Hours As Needed for Headache., Disp: , Rfl:   •  LIVALO 2 MG tablet tablet, TAKE 1/2 TABLET BY MOUTH DAILY, Disp: 15 tablet, Rfl: 0  •  aspirin 81 MG tablet, Take  by mouth., Disp: , Rfl:   •  diclofenac (VOLTAREN) 50 MG EC tablet, Take 1 tablet by mouth 2 (Two) Times a Day., Disp: 65 tablet, Rfl: 2  •  EPCLUSA 400-100 MG tablet, TK ONE T PO DAILY, Disp: , Rfl: 2  •  ibuprofen (ADVIL,MOTRIN) 800 MG tablet, Take 1,000 mg by mouth Every 6 (Six) Hours As Needed for Mild Pain ., Disp: , Rfl:   •  tiZANidine (ZANAFLEX) 4 MG tablet, TAKE 1/2 TO 1 TABLET BY MOUTH EVERY 8 HOURS AS NEEDED, Disp: 30 tablet, Rfl: 0  •  Unable to find, 1 each 1 (One) Time. Potassium OTC meq ?, Disp: , Rfl:     Review  of Systems   Constitutional: Negative.    Respiratory: Negative.    Cardiovascular: Negative.    Gastrointestinal: Negative.        Objective   Vitals:    12/11/17 1435   BP: 110/70   Temp: 97.6 °F (36.4 °C)       Physical Exam   Constitutional: He is oriented to person, place, and time. He appears well-developed and well-nourished.   HENT:   Head: Normocephalic and atraumatic.   Abdominal: Soft. Bowel sounds are normal. He exhibits no distension and no mass. There is no tenderness. No hernia.   Neurological: He is alert and oriented to person, place, and time.   Skin: Skin is warm and dry.   Psychiatric: He has a normal mood and affect. His behavior is normal. Judgment and thought content normal.   Vitals reviewed.      Assessment/Plan   Assessment:     1. Chronic hepatitis C without hepatic coma      Plan:   Pt has completed 12 week course of Epclusa for his HCV without issue.  His 4 week viral load was negative.    We will check end of treatment VL today, then 12 week post treatment levels to ensure SVR.          Henry Monae M.D.  [facility]  [address] [place] [address]  Office: [phone]

## 2017-12-13 LAB
HCV RNA SERPL NAA+PROBE-ACNC: NORMAL IU/ML
TEST INFORMATION: NORMAL

## 2017-12-13 NOTE — PROGRESS NOTES
End of treatment VL negative - results shared with patient  Please have him come in in 3 mos for CBC and 12 week end of treatment viral load.

## 2017-12-14 ENCOUNTER — HOSPITAL ENCOUNTER (OUTPATIENT)
Dept: PAIN MEDICINE | Facility: HOSPITAL | Age: 52
Discharge: HOME | End: 2017-12-14
Admitting: FAMILY MEDICINE

## 2017-12-14 ENCOUNTER — ANESTHESIA EVENT (OUTPATIENT)
Dept: PAIN MEDICINE | Facility: HOSPITAL | Age: 52
End: 2017-12-14

## 2017-12-14 ENCOUNTER — HOSPITAL ENCOUNTER (OUTPATIENT)
Dept: GENERAL RADIOLOGY | Facility: HOSPITAL | Age: 52
Discharge: HOME | End: 2017-12-14
Attending: ANESTHESIOLOGY

## 2017-12-14 ENCOUNTER — ANESTHESIA (OUTPATIENT)
Dept: PAIN MEDICINE | Facility: HOSPITAL | Age: 52
End: 2017-12-14

## 2017-12-14 VITALS
TEMPERATURE: 98 F | OXYGEN SATURATION: 96 % | RESPIRATION RATE: 16 BRPM | DIASTOLIC BLOOD PRESSURE: 69 MMHG | HEART RATE: 63 BPM | SYSTOLIC BLOOD PRESSURE: 111 MMHG

## 2017-12-14 DIAGNOSIS — R52 PAIN: ICD-10-CM

## 2017-12-14 DIAGNOSIS — M54.2 CERVICALGIA: ICD-10-CM

## 2017-12-14 PROCEDURE — C1755 CATHETER, INTRASPINAL: HCPCS

## 2017-12-14 PROCEDURE — 25010000002 FENTANYL CITRATE (PF) 100 MCG/2ML SOLUTION: Performed by: ANESTHESIOLOGY

## 2017-12-14 PROCEDURE — 25010000002 MIDAZOLAM PER 1 MG: Performed by: ANESTHESIOLOGY

## 2017-12-14 PROCEDURE — 25010000002 METHYLPREDNISOLONE PER 80 MG: Performed by: ANESTHESIOLOGY

## 2017-12-14 PROCEDURE — 77003 FLUOROGUIDE FOR SPINE INJECT: CPT

## 2017-12-14 RX ORDER — LIDOCAINE HYDROCHLORIDE 10 MG/ML
1 INJECTION, SOLUTION INFILTRATION; PERINEURAL ONCE AS NEEDED
Status: DISCONTINUED | OUTPATIENT
Start: 2017-12-14 | End: 2017-12-15 | Stop reason: HOSPADM

## 2017-12-14 RX ORDER — MIDAZOLAM HYDROCHLORIDE 1 MG/ML
1 INJECTION INTRAMUSCULAR; INTRAVENOUS AS NEEDED
Status: DISCONTINUED | OUTPATIENT
Start: 2017-12-14 | End: 2017-12-15 | Stop reason: HOSPADM

## 2017-12-14 RX ORDER — METHYLPREDNISOLONE ACETATE 80 MG/ML
80 INJECTION, SUSPENSION INTRA-ARTICULAR; INTRALESIONAL; INTRAMUSCULAR; SOFT TISSUE ONCE
Status: COMPLETED | OUTPATIENT
Start: 2017-12-14 | End: 2017-12-14

## 2017-12-14 RX ORDER — SODIUM CHLORIDE 0.9 % (FLUSH) 0.9 %
1-10 SYRINGE (ML) INJECTION AS NEEDED
Status: CANCELLED | OUTPATIENT
Start: 2017-12-15

## 2017-12-14 RX ORDER — FENTANYL CITRATE 50 UG/ML
50 INJECTION, SOLUTION INTRAMUSCULAR; INTRAVENOUS AS NEEDED
Status: DISCONTINUED | OUTPATIENT
Start: 2017-12-14 | End: 2017-12-15 | Stop reason: HOSPADM

## 2017-12-14 RX ORDER — SODIUM CHLORIDE 0.9 % (FLUSH) 0.9 %
1-10 SYRINGE (ML) INJECTION AS NEEDED
Status: DISCONTINUED | OUTPATIENT
Start: 2017-12-14 | End: 2017-12-15 | Stop reason: HOSPADM

## 2017-12-14 RX ADMIN — METHYLPREDNISOLONE ACETATE 80 MG: 80 INJECTION, SUSPENSION INTRA-ARTICULAR; INTRALESIONAL; INTRAMUSCULAR; SOFT TISSUE at 09:20

## 2017-12-14 RX ADMIN — FENTANYL CITRATE 50 MCG: 50 INJECTION, SOLUTION INTRAMUSCULAR; INTRAVENOUS at 09:17

## 2017-12-14 RX ADMIN — Medication 2 MG: at 09:17

## 2017-12-14 NOTE — H&P
[facility]    History and Physical    Patient Name: Bert Addison  :  1965  MRN:  [id number]  Date of Admission: 2017    Subjective     Patient is a 52 y.o. male presents with chief complaint of moderate, severe neck and shoulder: left pain. He has had two cervical epidurals several months ago for a C5-6 disc with central stenosis and was doing very well until the last few weeks with the pain returning.    The following portions of the patients history were reviewed and updated as appropriate: current medications, allergies, past medical history, past surgical history, past family history, past social history and problem list                Objective     Past Medical History:   Past Medical History:   Diagnosis Date   • 3-vessel CAD    • Chest pain    • Headache, tension-type    • Hepatitis C    • Hyperlipidemia    • Impingement syndrome of right shoulder    • Migraine    • Myalgia    • Neck pain    • Sleep apnea      Past Surgical History:   Past Surgical History:   Procedure Laterality Date   • COLONOSCOPY N/A 2017    lipomatous ileocecal valve, diverticulosis in sigmoid colon   • CORONARY ANGIOPLASTY WITH STENT PLACEMENT      Drug-eluting stent to the proximal ramus branch.  Drug-eluting stent to the mid right coronary artery.  2015.   • HERNIA REPAIR       Family History:   Family History   Problem Relation Age of Onset   • Heart disease Mother    • Heart attack Mother    • Other Mother      Pacemaker   • Heart disease Father    • Hypertension Sister    • Heart attack Sister    • Heart attack Brother      Social History:   Social History   Substance Use Topics   • Smoking status: Former Smoker     Packs/day: 1.00     Years: 30.00     Types: Cigarettes     Quit date:    • Smokeless tobacco: Never Used   • Alcohol use No      Comment: daily caffeine use       Vital Signs Range for the last 24 hours  Temperature: Temp:  [36.7 °C (98 °F)] 36.7 °C (98 °F)   Temp Source: Temp  src: Oral   BP: BP: (129)/(94) 129/94   Pulse: Heart Rate:  [68] 68   Respirations: Resp:  [16] 16   SPO2: SpO2:  [98 %] 98 %   O2 Amount (l/min):     O2 Devices O2 Device: room air   Weight:           --------------------------------------------------------------------------------    Current Outpatient Prescriptions   Medication Sig Dispense Refill   • diclofenac (VOLTAREN) 50 MG EC tablet Take 1 tablet by mouth 2 (Two) Times a Day. 65 tablet 2   • ibuprofen (ADVIL,MOTRIN) 800 MG tablet Take 1,000 mg by mouth Every 6 (Six) Hours As Needed for Mild Pain .     • LIVALO 2 MG tablet tablet TAKE 1/2 TABLET BY MOUTH DAILY 15 tablet 0   • aspirin 81 MG tablet Take  by mouth.     • aspirin-acetaminophen-caffeine (EXCEDRIN MIGRAINE) 250-250-65 MG per tablet Take 1 tablet by mouth Every 6 (Six) Hours As Needed for Headache.     • EPCLUSA 400-100 MG tablet TK ONE T PO DAILY  2   • tiZANidine (ZANAFLEX) 4 MG tablet TAKE 1/2 TO 1 TABLET BY MOUTH EVERY 8 HOURS AS NEEDED 30 tablet 0   • Unable to find 1 each 1 (One) Time. Potassium OTC meq ?       Current Facility-Administered Medications   Medication Dose Route Frequency Provider Last Rate Last Dose   • fentaNYL citrate (PF) (SUBLIMAZE) injection 50 mcg  50 mcg Intravenous PRN Dario Burns MD       • iopamidol (ISOVUE-M 200) injection 41%  12 mL Epidural Once PRN Dario Burns MD       • lidocaine (XYLOCAINE) 1 % injection 1 mL  1 mL Intradermal Once PRN Dario Burns MD       • methylPREDNISolone acetate (DEPO-medrol) injection 80 mg  80 mg Intra-articular Once Dario Burns MD       • midazolam (VERSED) injection 1 mg  1 mg Intravenous PRN Dario Burns MD       • sodium chloride 0.9 % flush 1-10 mL  1-10 mL Intravenous PRN Dario Burns MD           --------------------------------------------------------------------------------  Assessment/Plan      Anesthesia Evaluation            Airway   Mallampati: II  small opening  Dental      Pulmonary     breath sounds clear to  auscultation  Cardiovascular     Rate: normal        Neuro/Psych  GI/Hepatic/Renal/Endo      Musculoskeletal     Abdominal    Substance History      OB/GYN          Other                                   Diagnosis and Plan    Treatment Plan  ASA 2   Patient has had previous injection/procedure with 50-75% improvement.   Procedures: Cervical Epidural Steroid Injection(EVELIN), With fluoroscopy,       Anesthetic plan and risks discussed with patient.          Diagnosis     * Cervical disc disorder [M50.90]     * Cervical spinal stenosis [M48.02]

## 2017-12-14 NOTE — ANESTHESIA PROCEDURE NOTES
PAIN Epidural block    Patient location during procedure: pain clinic  Start Time: 12/14/2017 9:17 AM  Stop Time: 12/14/2017 9:23 AM  Indication:procedure for pain  Performed By  Anesthesiologist: CHINEDU HERNANDEZ  Preanesthetic Checklist  Completed: patient identified, site marked, surgical consent, pre-op evaluation, timeout performed, IV checked, risks and benefits discussed and monitors and equipment checked  Additional Notes  Post-Op Diagnosis Codes:     * Cervical disc disorder (M50.90)     * Cervical spinal stenosis (M48.02)  Performed under fluoroscopic guidance.  Prep:  Pt Position:prone  Sterile Tech:cap, gloves, mask and sterile barrier  Prep:chlorhexidine gluconate and isopropyl alcohol  Monitoring:blood pressure monitoring, continuous pulse oximetry and EKG  Procedure:  Sedation: yes   Approach:midline  Guidance: fluoroscopy  Location:cervical (Intralaminar)  Level:6-7  Needle Type:Tuohy  Needle Gauge:20  Aspiration:negative  Medications:  Depomedrol:80  Preservative Free Saline:2mL    Post Assessment:  Post-procedure: Band-Aid.  Pt Tolerance:patient tolerated the procedure well with no apparent complications  Complications:no

## 2017-12-19 RX ORDER — TIZANIDINE 4 MG/1
4 TABLET ORAL EVERY 8 HOURS PRN
Qty: 30 TABLET | Refills: 0 | Status: SHIPPED | OUTPATIENT
Start: 2017-12-19 | End: 2018-01-18 | Stop reason: SDUPTHER

## 2017-12-28 ENCOUNTER — TELEPHONE (OUTPATIENT)
Dept: GASTROENTEROLOGY | Facility: CLINIC | Age: 52
End: 2017-12-28

## 2017-12-28 NOTE — TELEPHONE ENCOUNTER
----- Message from Henry Monae MD sent at 12/13/2017 12:03 PM EST -----  End of treatment VL negative - results shared with patient  Please have him come in in 3 mos for CBC and 12 week end of treatment viral load.

## 2017-12-28 NOTE — TELEPHONE ENCOUNTER
Patient called advised of Dr. Monae's note. He verb understanding. Scheduled f/u lab work for 3/7/18@1100.

## 2018-01-10 NOTE — TELEPHONE ENCOUNTER
Patient should discuss continuation of medication with PCP as he is no longer being followed by us. Dr. Alexander's last note said surgery should be his last option.

## 2018-01-19 RX ORDER — TIZANIDINE 4 MG/1
TABLET ORAL
Qty: 30 TABLET | Refills: 0 | Status: SHIPPED | OUTPATIENT
Start: 2018-01-19 | End: 2018-02-07 | Stop reason: SDUPTHER

## 2018-01-23 ENCOUNTER — TELEPHONE (OUTPATIENT)
Dept: NEUROSURGERY | Facility: CLINIC | Age: 53
End: 2018-01-23

## 2018-01-23 NOTE — TELEPHONE ENCOUNTER
Per [facility] patient needs a follow up with no new imaging. Patient will come in on 2/7/18. He agrees and accepts. Patient will call to see if there are any cancellations.

## 2018-01-23 NOTE — TELEPHONE ENCOUNTER
This patient last saw Dr. Alexander on 10/2/17. Dr. Alexander stated in the Plan that if medications did not work then he should start PT and traction. Dr. Alexander said he would order if the patient failed to respond to NSAIDS.    Patient is still having same pain the medications are no longer helping. He agrees that surgery should be last resort. He would like us to go ahead and order PT. We can call him back at [phone].

## 2018-02-07 RX ORDER — TIZANIDINE 4 MG/1
TABLET ORAL
Qty: 30 TABLET | Refills: 0 | Status: SHIPPED | OUTPATIENT
Start: 2018-02-07 | End: 2018-03-11 | Stop reason: SDUPTHER

## 2018-02-19 ENCOUNTER — TELEPHONE (OUTPATIENT)
Dept: SPORTS MEDICINE | Facility: CLINIC | Age: 53
End: 2018-02-19

## 2018-02-19 NOTE — TELEPHONE ENCOUNTER
He needs a new referral for the new year for pain management can this be put in or does he need to be seen?

## 2018-02-21 DIAGNOSIS — M48.02 STENOSIS OF CERVICAL SPINE: Primary | ICD-10-CM

## 2018-02-27 ENCOUNTER — TELEPHONE (OUTPATIENT)
Dept: GASTROENTEROLOGY | Facility: CLINIC | Age: 53
End: 2018-02-27

## 2018-02-27 DIAGNOSIS — B18.2 CHRONIC HEPATITIS C WITHOUT HEPATIC COMA (HCC): Primary | ICD-10-CM

## 2018-03-12 RX ORDER — TIZANIDINE 4 MG/1
TABLET ORAL
Qty: 30 TABLET | Refills: 0 | Status: SHIPPED | OUTPATIENT
Start: 2018-03-12 | End: 2018-04-08 | Stop reason: SDUPTHER

## 2018-03-15 ENCOUNTER — TELEPHONE (OUTPATIENT)
Dept: GASTROENTEROLOGY | Facility: CLINIC | Age: 53
End: 2018-03-15

## 2018-03-24 LAB
ERYTHROCYTE [DISTWIDTH] IN BLOOD BY AUTOMATED COUNT: 13.2 % (ref 11.5–14.5)
HCT VFR BLD AUTO: 40.3 % (ref 40.4–52.2)
HCV RNA SERPL NAA+PROBE-ACNC: NORMAL IU/ML
HGB BLD-MCNC: 13.5 G/DL (ref 13.7–17.6)
MCH RBC QN AUTO: 29.1 PG (ref 27–32.7)
MCHC RBC AUTO-ENTMCNC: 33.5 G/DL (ref 32.6–36.4)
MCV RBC AUTO: 86.9 FL (ref 79.8–96.2)
PLATELET # BLD AUTO: 114 10*3/MM3 (ref 140–500)
RBC # BLD AUTO: 4.64 10*6/MM3 (ref 4.6–6)
TEST INFORMATION: NORMAL
WBC # BLD AUTO: 5.99 10*3/MM3 (ref 4.5–10.7)

## 2018-04-10 RX ORDER — TIZANIDINE 4 MG/1
TABLET ORAL
Qty: 30 TABLET | Refills: 0 | Status: SHIPPED | OUTPATIENT
Start: 2018-04-10 | End: 2018-05-04 | Stop reason: SDUPTHER

## 2018-04-12 ENCOUNTER — TELEPHONE (OUTPATIENT)
Dept: GASTROENTEROLOGY | Facility: CLINIC | Age: 53
End: 2018-04-12

## 2018-04-12 NOTE — TELEPHONE ENCOUNTER
Patient called. Advised as per Dr. Monae his  Hep C viral load is negative and is consistent with cure. Advised will need to f/u with Dr. Monae as needed. Advised his platelet count has been consistently lowe and when asked if he has seen a hemotologist for this he states no. Advised will refer him to Dr. Castañeda as per Dr. Monae's order he verb understanding and is in agreement.

## 2018-04-12 NOTE — TELEPHONE ENCOUNTER
----- Message from Henry Monae MD sent at 4/9/2018  7:40 AM EDT -----  Pts 12 week post-treatment viral load is negative  This is consistent with cure of disease - great news!  He can f/u with me as needed.      His platelet count does still remain chronically low, but stable.  Can we determine if pt has ever seen hematology for this - if not, can we please refer to Dr. Castañeda for further evaluation.  I don't think this is related to his HCV.

## 2018-04-16 NOTE — TELEPHONE ENCOUNTER
Per Nadya DELGADO : Referral has been sent to Dr. Obregon office, they will call  Patient to schedule. (Routing comment

## 2018-04-17 ENCOUNTER — OFFICE VISIT (OUTPATIENT)
Dept: CARDIOLOGY | Facility: CLINIC | Age: 53
End: 2018-04-17

## 2018-04-17 VITALS
SYSTOLIC BLOOD PRESSURE: 104 MMHG | BODY MASS INDEX: 31.04 KG/M2 | DIASTOLIC BLOOD PRESSURE: 84 MMHG | WEIGHT: 197.8 LBS | HEIGHT: 67 IN | RESPIRATION RATE: 16 BRPM | HEART RATE: 67 BPM

## 2018-04-17 DIAGNOSIS — E78.2 MIXED HYPERLIPIDEMIA: ICD-10-CM

## 2018-04-17 DIAGNOSIS — I25.10 CORONARY ARTERY DISEASE INVOLVING NATIVE CORONARY ARTERY OF NATIVE HEART WITHOUT ANGINA PECTORIS: Primary | ICD-10-CM

## 2018-04-17 PROCEDURE — 99214 OFFICE O/P EST MOD 30 MIN: CPT | Performed by: INTERNAL MEDICINE

## 2018-04-17 PROCEDURE — 93000 ELECTROCARDIOGRAM COMPLETE: CPT | Performed by: INTERNAL MEDICINE

## 2018-04-17 NOTE — PROGRESS NOTES
PATIENTINFORMATION    Date of Office Visit: 2018  Encounter Provider: Shyann Robertson MD  Place of Service: [facility]  Patient Name: Bert Augustni  : 1965    Subjective:     Encounter Date:2018      Patient ID: Bert Augustin is a 52 y.o. male.      History of Present Illness    This is a nice gentleman who self-referred for chest pain in 2015.  His symptoms were very typical so I sent him straight for a heart catheterization.  This showed a significant lesion in the proximal ramus branch and a significant lesion in the mid-right coronary artery, both of which were stented with drug-eluting stents.      He has done well this last year.  He was exercising regularly.  He denies palpitations, shortness of breath, edema, lightheadedness, chest pain or fatigue.  He had no exertional symptoms.    Review of Systems   Constitution: Negative for fever, malaise/fatigue, weight gain and weight loss.   HENT: Negative for ear pain, hearing loss, nosebleeds and sore throat.    Eyes: Negative for double vision, pain, vision loss in left eye and vision loss in right eye.   Cardiovascular:        See history of present illness.   Respiratory: Negative for cough, shortness of breath, sleep disturbances due to breathing, snoring and wheezing.    Endocrine: Negative for cold intolerance, heat intolerance and polyuria.   Skin: Negative for itching, poor wound healing and rash.   Musculoskeletal: Negative for joint pain, joint swelling and myalgias.   Gastrointestinal: Negative for abdominal pain, diarrhea, hematochezia, nausea and vomiting.   Genitourinary: Negative for hematuria and hesitancy.   Neurological: Negative for numbness, paresthesias and seizures.   Psychiatric/Behavioral: Negative for depression. The patient is not nervous/anxious.            ECG 12 Lead  Date/Time: 2018 2:07 PM  Performed by: SHYANN ROBERTSON  Authorized by: SHYANN ROBERTSON  "  Comparison: compared with previous ECG from 4/17/2017  Similar to previous ECG  Rhythm: sinus rhythm  BPM: 67  Conduction: conduction normal  ST Segments: ST segments normal  T Waves: T waves normal  Clinical impression: normal ECG               Objective:     /84 (BP Location: Right arm, Patient Position: Sitting, Cuff Size: Adult)   Pulse 67   Resp 16   Ht 170.2 cm (67\")   Wt 89.7 kg (197 lb 12.8 oz)   BMI 30.98 kg/m²  Body mass index is 30.98 kg/m².     Physical Exam   Constitutional: He appears well-developed.   HENT:   Head: Normocephalic and atraumatic.   Eyes: Conjunctivae and lids are normal. Pupils are equal, round, and reactive to light. Lids are everted and swept, no foreign bodies found.   Neck: Normal range of motion. No JVD present. Carotid bruit is not present. No tracheal deviation present. No thyroid mass present.   Cardiovascular: Normal rate, regular rhythm and normal heart sounds.    Pulses:       Dorsalis pedis pulses are 2+ on the right side, and 2+ on the left side.   Pulmonary/Chest: Effort normal and breath sounds normal.   Abdominal: Normal appearance and bowel sounds are normal.   Musculoskeletal: Normal range of motion.   Neurological: He is alert. He has normal strength.   Skin: Skin is warm, dry and intact.   Psychiatric: He has a normal mood and affect. His behavior is normal.   Vitals reviewed.            Assessment/Plan:       1. Coronary Artery Disease  Assessment  • The patient has no angina    Plan  • Lifestyle modifications discussed include adhering to a heart healthy diet, avoidance of tobacco products, maintenance of a healthy weight and regular exercise    Subjective - Objective  • There has been a previous stent procedure using PAM on or around 1/19/2015  • Current antiplatelet therapy includes aspirin 81 mg    I encouraged him to eat a heart healthy diet and exercise regularly  2.  Hyperlipidemia.  He is due for lab work.  He has an appointment with Dr. Mast " later this week.  He needs a complete metabolic panel and a lipid panel    I will see him back in a year unless he has any problems sooner    Orders Placed This Encounter   Procedures   • ECG 12 Lead     This order was created via procedure documentation      Bert Augustin   Home Medication Instructions HAILEY:    Printed on:04/17/18 6842   Medication Information                      aspirin 81 MG tablet  Take  by mouth.             aspirin-acetaminophen-caffeine (EXCEDRIN MIGRAINE) 250-250-65 MG per tablet  Take 1 tablet by mouth Every 6 (Six) Hours As Needed for Headache.             diclofenac (VOLTAREN) 50 MG EC tablet  TAKE 1 TABLET BY MOUTH TWICE DAILY AS NEEDED FOR PAIN             EPCLUSA 400-100 MG tablet  TK ONE T PO DAILY             pitavastatin calcium (LIVALO) 2 MG tablet tablet  Take 0.5 tablets by mouth Daily.             tiZANidine (ZANAFLEX) 4 MG tablet  TAKE 1 TABLET BY MOUTH EVERY 8 HOURS AS NEEDED FOR MUSCLE SPAMS             Unable to find  1 each 1 (One) Time. Potassium OTC meq ?                        Shyann Robertson MD  04/17/18  2:55 PM

## 2018-04-19 ENCOUNTER — OFFICE VISIT (OUTPATIENT)
Dept: SPORTS MEDICINE | Facility: CLINIC | Age: 53
End: 2018-04-19

## 2018-04-19 VITALS
SYSTOLIC BLOOD PRESSURE: 122 MMHG | HEART RATE: 65 BPM | DIASTOLIC BLOOD PRESSURE: 74 MMHG | OXYGEN SATURATION: 98 % | TEMPERATURE: 97.6 F | WEIGHT: 196 LBS | BODY MASS INDEX: 30.76 KG/M2 | HEIGHT: 67 IN

## 2018-04-19 DIAGNOSIS — Z00.00 ANNUAL PHYSICAL EXAM: Primary | ICD-10-CM

## 2018-04-19 PROCEDURE — 99396 PREV VISIT EST AGE 40-64: CPT | Performed by: FAMILY MEDICINE

## 2018-04-19 NOTE — PROGRESS NOTES
"Bert Augustin is here today for an annual physical exam.     Eating a healthy diet. Exercising routinely.     I have reviewed the patient's medical, family, and social history in detail and updated the computerized patient record.    -Noted low platelets, but no history of abnormal bleeding or bruising.  Having follow with hemoptysis.  -Persistent neck pain - better with exercise but still has episodes of severe pain.  Continues to take NSAIDs most days.    Health Maintenance   Topic Date Due   • TDAP/TD VACCINES (1 - Tdap) 09/20/1984   • LIPID PANEL  04/18/2018   • INFLUENZA VACCINE  08/01/2018   • COLONOSCOPY  07/24/2027   • HEPATITIS C SCREENING  Completed       Review of Systems   Constitutional: Negative for fever.   Respiratory: Negative for shortness of breath.    Cardiovascular: Negative for chest pain.   All other systems reviewed and are negative.      /74   Pulse 65   Temp 97.6 °F (36.4 °C)   Ht 170.2 cm (67\")   Wt 88.9 kg (196 lb)   SpO2 98%   BMI 30.70 kg/m²      Physical Exam    Vital signs reviewed.  General appearance: No acute distress  Eyes: conjunctiva clear without erythema; pupils equally round and reactive  ENT: external ears and nose normal; hearing normal, oropharynx clear  Neck: supple; no thyromegaly  CV: normal rate and rhythm; no peripheral edema  Respiratory: normal respiratory effort; lungs clear to auscultation bilaterally  MSK: normal gait and station; no focal joint deformity or swelling  Skin: no rash or wounds; normal turgor  Neuro: cranial nerves 2-12 grossly intact; normal sensation to light touch  Psych: mood and affect normal; recent and remote memory intact      Bert was seen today for annual exam.    Diagnoses and all orders for this visit:    Annual physical exam  -     Comprehensive Metabolic Panel  -     Lipid Panel  -     PSA Screen  -     Thyroid Cascade Profile  -     Urinalysis With / Culture If Indicated - Urine, Clean Catch  -     Vitamin D 25 " Hydroxy        Encourage healthy diet and exercise.  Encourage patient to stay up to date on screening examinations as indicated based on age and risk factors.  Recommend consideration of physical therapy for his neck, specifically trial of gentle traction.  Discussed trying to cut down his NSAID burden, he is on a start with trying to only take diclofenac in the morning before work and Tylenol later on if needed.

## 2018-04-27 LAB
25(OH)D3+25(OH)D2 SERPL-MCNC: 24.2 NG/ML (ref 30–100)
ALBUMIN SERPL-MCNC: 4.7 G/DL (ref 3.5–5.2)
ALBUMIN/GLOB SERPL: 1.4 G/DL
ALP SERPL-CCNC: 42 U/L (ref 39–117)
ALT SERPL-CCNC: 25 U/L (ref 1–41)
APPEARANCE UR: CLEAR
AST SERPL-CCNC: 23 U/L (ref 1–40)
BACTERIA #/AREA URNS HPF: NORMAL /HPF
BILIRUB SERPL-MCNC: 0.9 MG/DL (ref 0.1–1.2)
BILIRUB UR QL STRIP: NEGATIVE
BUN SERPL-MCNC: 21 MG/DL (ref 6–20)
BUN/CREAT SERPL: 23.3 (ref 7–25)
CALCIUM SERPL-MCNC: 9.5 MG/DL (ref 8.6–10.5)
CHLORIDE SERPL-SCNC: 104 MMOL/L (ref 98–107)
CHOLEST SERPL-MCNC: 165 MG/DL (ref 0–200)
CO2 SERPL-SCNC: 29.4 MMOL/L (ref 22–29)
COLOR UR: YELLOW
CREAT SERPL-MCNC: 0.9 MG/DL (ref 0.76–1.27)
EPI CELLS #/AREA URNS HPF: NORMAL /HPF
GFR SERPLBLD CREATININE-BSD FMLA CKD-EPI: 107 ML/MIN/1.73
GFR SERPLBLD CREATININE-BSD FMLA CKD-EPI: 89 ML/MIN/1.73
GLOBULIN SER CALC-MCNC: 3.4 GM/DL
GLUCOSE SERPL-MCNC: 103 MG/DL (ref 65–99)
GLUCOSE UR QL: NEGATIVE
HDLC SERPL-MCNC: 38 MG/DL (ref 40–60)
HGB UR QL STRIP: NEGATIVE
KETONES UR QL STRIP: NEGATIVE
LDLC SERPL CALC-MCNC: 107 MG/DL (ref 0–100)
LEUKOCYTE ESTERASE UR QL STRIP: NEGATIVE
MICRO URNS: NORMAL
MICRO URNS: NORMAL
MUCOUS THREADS URNS QL MICRO: PRESENT /HPF
NITRITE UR QL STRIP: NEGATIVE
PH UR STRIP: 6 [PH] (ref 5–7.5)
POTASSIUM SERPL-SCNC: 4.9 MMOL/L (ref 3.5–5.2)
PROT SERPL-MCNC: 8.1 G/DL (ref 6–8.5)
PROT UR QL STRIP: NEGATIVE
PSA SERPL-MCNC: 3.63 NG/ML (ref 0–4)
RBC #/AREA URNS HPF: NORMAL /HPF
SODIUM SERPL-SCNC: 141 MMOL/L (ref 136–145)
SP GR UR: 1.02 (ref 1–1.03)
TRIGL SERPL-MCNC: 100 MG/DL (ref 0–150)
TSH SERPL DL<=0.005 MIU/L-ACNC: 2.85 UIU/ML (ref 0.45–4.5)
URINALYSIS REFLEX: NORMAL
UROBILINOGEN UR STRIP-MCNC: 0.2 MG/DL (ref 0.2–1)
VLDLC SERPL CALC-MCNC: 20 MG/DL (ref 5–40)
WBC #/AREA URNS HPF: NORMAL /HPF

## 2018-05-01 ENCOUNTER — TELEPHONE (OUTPATIENT)
Dept: SPORTS MEDICINE | Facility: CLINIC | Age: 53
End: 2018-05-01

## 2018-05-01 DIAGNOSIS — R73.09 ELEVATED GLUCOSE LEVEL: Primary | ICD-10-CM

## 2018-05-04 ENCOUNTER — OFFICE VISIT (OUTPATIENT)
Dept: SPORTS MEDICINE | Facility: CLINIC | Age: 53
End: 2018-05-04

## 2018-05-04 VITALS
BODY MASS INDEX: 30.76 KG/M2 | SYSTOLIC BLOOD PRESSURE: 118 MMHG | OXYGEN SATURATION: 98 % | HEART RATE: 84 BPM | TEMPERATURE: 97.9 F | WEIGHT: 196 LBS | HEIGHT: 67 IN | DIASTOLIC BLOOD PRESSURE: 78 MMHG

## 2018-05-04 DIAGNOSIS — M79.10 MYALGIA: ICD-10-CM

## 2018-05-04 DIAGNOSIS — G44.52 NEW DAILY PERSISTENT HEADACHE: Primary | ICD-10-CM

## 2018-05-04 LAB
EXPIRATION DATE: NORMAL
FLUAV AG NPH QL: NORMAL
FLUBV AG NPH QL: NORMAL
INTERNAL CONTROL: NORMAL
Lab: NORMAL

## 2018-05-04 PROCEDURE — 99214 OFFICE O/P EST MOD 30 MIN: CPT | Performed by: FAMILY MEDICINE

## 2018-05-04 PROCEDURE — 87804 INFLUENZA ASSAY W/OPTIC: CPT | Performed by: FAMILY MEDICINE

## 2018-05-04 PROCEDURE — 96372 THER/PROPH/DIAG INJ SC/IM: CPT | Performed by: FAMILY MEDICINE

## 2018-05-04 RX ORDER — METHYLPREDNISOLONE ACETATE 80 MG/ML
80 INJECTION, SUSPENSION INTRA-ARTICULAR; INTRALESIONAL; INTRAMUSCULAR; SOFT TISSUE ONCE
Status: COMPLETED | OUTPATIENT
Start: 2018-05-04 | End: 2018-05-04

## 2018-05-04 RX ORDER — TIZANIDINE 4 MG/1
4 TABLET ORAL EVERY 8 HOURS PRN
Qty: 30 TABLET | Refills: 0 | Status: SHIPPED | OUTPATIENT
Start: 2018-05-04 | End: 2023-01-24

## 2018-05-04 RX ADMIN — METHYLPREDNISOLONE ACETATE 80 MG: 80 INJECTION, SUSPENSION INTRA-ARTICULAR; INTRALESIONAL; INTRAMUSCULAR; SOFT TISSUE at 15:10

## 2018-05-04 NOTE — PROGRESS NOTES
"Bert is a 52 y.o. year old male    Chief Complaint   Patient presents with   • Migraine     x 3 days        History of Present Illness   HPI   Here today for headache myalgias that started a few days ago.  Rather severe, rather abrupt onset.  Generalized, aching.  No fever or chills.  No known sick contacts.    I have reviewed the patient's medical, family, and social history in detail and updated the computerized patient record.    Review of Systems   Constitutional: Positive for fatigue. Negative for chills and fever.   Respiratory: Negative for shortness of breath.    Cardiovascular: Negative for chest pain.   Musculoskeletal: Positive for arthralgias and myalgias.   Neurological: Positive for headaches. Negative for dizziness.       /78   Pulse 84   Temp 97.9 °F (36.6 °C)   Ht 170 cm (66.93\")   Wt 88.9 kg (196 lb)   SpO2 98%   BMI 30.76 kg/m²      Physical Exam   Constitutional: He is oriented to person, place, and time. He appears well-developed and well-nourished. No distress.   Appears uncomfortable but not in acute distress   HENT:   Mouth/Throat: Oropharynx is clear and moist.   Eyes: EOM are normal. Pupils are equal, round, and reactive to light.   Neck: Normal range of motion.   Pulmonary/Chest: Effort normal.   Musculoskeletal: Normal range of motion.   Nonspecific muscle tenderness diffusely, but does appear to have significant tenderness at the occipital condyles   Neurological: He is alert and oriented to person, place, and time. No cranial nerve deficit.   Skin: He is not diaphoretic.   Nursing note and vitals reviewed.       Diagnoses and all orders for this visit:    New daily persistent headache  -     POC Influenza A / B  -     methylPREDNISolone acetate (DEPO-medrol) injection 80 mg; Inject 1 mL into the shoulder, thigh, or buttocks 1 (One) Time.  -     CBC & Differential  -     Comprehensive Metabolic Panel  -     CK  -     Aldolase  -     IgG  -     IgM  -     IgA    Myalgia  -  "    POC Influenza A / B  -     methylPREDNISolone acetate (DEPO-medrol) injection 80 mg; Inject 1 mL into the shoulder, thigh, or buttocks 1 (One) Time.  -     CBC & Differential  -     Comprehensive Metabolic Panel  -     CK  -     Aldolase  -     IgG  -     IgM  -     IgA    Other orders  -     tiZANidine (ZANAFLEX) 4 MG tablet; Take 1 tablet by mouth Every 8 (Eight) Hours As Needed for Muscle Spasms.       Overall I think this is an atypical migraine.  Will check labs to rule out an acute systemic process.

## 2018-05-07 LAB
ALBUMIN SERPL-MCNC: 4.3 G/DL (ref 3.5–5.2)
ALBUMIN/GLOB SERPL: 1.5 G/DL
ALDOLASE SERPL-CCNC: 4.3 U/L (ref 3.3–10.3)
ALP SERPL-CCNC: 43 U/L (ref 39–117)
ALT SERPL-CCNC: 21 U/L (ref 1–41)
AST SERPL-CCNC: 17 U/L (ref 1–40)
BASOPHILS # BLD AUTO: 0.01 10*3/MM3 (ref 0–0.2)
BASOPHILS NFR BLD AUTO: 0.2 % (ref 0–1.5)
BILIRUB SERPL-MCNC: 0.7 MG/DL (ref 0.1–1.2)
BUN SERPL-MCNC: 20 MG/DL (ref 6–20)
BUN/CREAT SERPL: 22.2 (ref 7–25)
CALCIUM SERPL-MCNC: 8.7 MG/DL (ref 8.6–10.5)
CHLORIDE SERPL-SCNC: 103 MMOL/L (ref 98–107)
CK SERPL-CCNC: 85 U/L (ref 20–200)
CO2 SERPL-SCNC: 26 MMOL/L (ref 22–29)
CREAT SERPL-MCNC: 0.9 MG/DL (ref 0.76–1.27)
EOSINOPHIL # BLD AUTO: 0.19 10*3/MM3 (ref 0–0.7)
EOSINOPHIL NFR BLD AUTO: 3.5 % (ref 0.3–6.2)
ERYTHROCYTE [DISTWIDTH] IN BLOOD BY AUTOMATED COUNT: 12.9 % (ref 11.5–14.5)
GFR SERPLBLD CREATININE-BSD FMLA CKD-EPI: 107 ML/MIN/1.73
GFR SERPLBLD CREATININE-BSD FMLA CKD-EPI: 89 ML/MIN/1.73
GLOBULIN SER CALC-MCNC: 2.9 GM/DL
GLUCOSE SERPL-MCNC: 128 MG/DL (ref 65–99)
HCT VFR BLD AUTO: 40.3 % (ref 40.4–52.2)
HGB BLD-MCNC: 13.4 G/DL (ref 13.7–17.6)
IGA SERPL-MCNC: 458 MG/DL (ref 90–386)
IGG SERPL-MCNC: 1364 MG/DL (ref 700–1600)
IGM SERPL-MCNC: 46 MG/DL (ref 20–172)
IMM GRANULOCYTES # BLD: 0 10*3/MM3 (ref 0–0.03)
IMM GRANULOCYTES NFR BLD: 0 % (ref 0–0.5)
LYMPHOCYTES # BLD AUTO: 2.62 10*3/MM3 (ref 0.9–4.8)
LYMPHOCYTES NFR BLD AUTO: 47.7 % (ref 19.6–45.3)
MCH RBC QN AUTO: 29.4 PG (ref 27–32.7)
MCHC RBC AUTO-ENTMCNC: 33.3 G/DL (ref 32.6–36.4)
MCV RBC AUTO: 88.4 FL (ref 79.8–96.2)
MONOCYTES # BLD AUTO: 0.5 10*3/MM3 (ref 0.2–1.2)
MONOCYTES NFR BLD AUTO: 9.1 % (ref 5–12)
NEUTROPHILS # BLD AUTO: 2.17 10*3/MM3 (ref 1.9–8.1)
NEUTROPHILS NFR BLD AUTO: 39.5 % (ref 42.7–76)
PLATELET # BLD AUTO: 97 10*3/MM3 (ref 140–500)
POTASSIUM SERPL-SCNC: 3.7 MMOL/L (ref 3.5–5.2)
PROT SERPL-MCNC: 7.2 G/DL (ref 6–8.5)
RBC # BLD AUTO: 4.56 10*6/MM3 (ref 4.6–6)
SODIUM SERPL-SCNC: 142 MMOL/L (ref 136–145)
WBC # BLD AUTO: 5.49 10*3/MM3 (ref 4.5–10.7)

## 2018-05-10 ENCOUNTER — TELEPHONE (OUTPATIENT)
Dept: SPORTS MEDICINE | Facility: CLINIC | Age: 53
End: 2018-05-10

## 2018-05-10 NOTE — TELEPHONE ENCOUNTER
He is not feeling better, actually feeling worse.   hes still having the constant headache no relief from the shot we gave him, congestion and terrible body aches. Does he need to be seen again or do you suggest a medication for him?    ----- Message from Kaitlin Amaro MA sent at 5/10/2018  1:23 PM EDT -----      ----- Message -----  From: Lisa Stringer MA  Sent: 5/8/2018  12:14 PM  To: Kaitlin Amaro MA    Please advise pt.

## 2018-05-10 NOTE — PROGRESS NOTES
.     REASON FOR CONSULTATION:   Thrombocytopenia  Provide an opinion on any further workup or treatment                             REQUESTING PHYSICIAN: Henry Monae MD  RECORDS OBTAINED:  Records of the patients history including those obtained from the referring provider were reviewed and summarized in detail.    HISTORY OF PRESENT ILLNESS:  The patient is a 52 y.o. year old male  who is here for follow-up with the above-mentioned history.        Chronic hepatitis C.  Epclusa x 12 weeks (Completed therapy around 12/11/17) with 12 week follow-up lab work negative, consistent with cure of hepatitis C.    Sent for consultation by Dr. Monae due to persistent thrombocytopenia.    Denies bleeding.  Denies fevers, chills, weight loss, night sweats.    Past Medical History:   Diagnosis Date   • 3-vessel CAD    • CAD (coronary artery disease)    • Cervical spinal stenosis    • Chest pain    • H/O Diverticulitis    • Headache, tension-type    • Hepatitis C    • Hyperlipidemia    • Impingement syndrome of right shoulder    • Migraine    • Myalgia    • Neck pain    • Poor sleep    • Sleep apnea      Past Surgical History:   Procedure Laterality Date   • COLONOSCOPY N/A 7/24/2017    lipomatous ileocecal valve, diverticulosis in sigmoid colon   • CORONARY ANGIOPLASTY WITH STENT PLACEMENT      Drug-eluting stent to the proximal ramus branch.  Drug-eluting stent to the mid right coronary artery.  January 2015.   • HERNIA REPAIR         HEMATOLOGIC/ONCOLOGIC HISTORY:  (History from previous dates can be found in the separate document.)    MEDICATIONS    Current Outpatient Prescriptions:   •  aspirin 81 MG tablet, Take  by mouth., Disp: , Rfl:   •  diclofenac (VOLTAREN) 50 MG EC tablet, TAKE 1 TABLET BY MOUTH TWICE DAILY AS NEEDED FOR PAIN, Disp: 60 tablet, Rfl: 0  •  pitavastatin calcium (LIVALO) 2 MG tablet tablet, Take 0.5 tablets by mouth Daily., Disp: 15 tablet, Rfl: 5  •  tiZANidine (ZANAFLEX) 4 MG tablet,  "Take 1 tablet by mouth Every 8 (Eight) Hours As Needed for Muscle Spasms., Disp: 30 tablet, Rfl: 0    ALLERGIES:   No Known Allergies    SOCIAL HISTORY:       Social History     Social History   • Marital status:      Spouse name: Bella   • Number of children: 5   • Years of education: N/A     Occupational History   •  [employer]     Social History Main Topics   • Smoking status: Former Smoker     Packs/day: 1.00     Years: 30.00     Types: Cigarettes     Quit date: 2012   • Smokeless tobacco: Never Used   • Alcohol use No      Comment: daily caffeine use   • Drug use: No   • Sexual activity: Defer     Other Topics Concern   • Not on file     Social History Narrative   • No narrative on file         FAMILY HISTORY:  Family History   Problem Relation Age of Onset   • Heart disease Mother    • Heart attack Mother    • Other Mother      Pacemaker   • Heart disease Father    • Hypertension Sister    • Heart attack Sister    • Heart attack Brother        REVIEW OF SYSTEMS:  Review of Systems   Constitutional: Negative for activity change.   HENT: Positive for congestion, postnasal drip and rhinorrhea. Negative for nosebleeds and trouble swallowing.    Respiratory: Negative for shortness of breath and wheezing.    Cardiovascular: Negative for chest pain and palpitations.   Gastrointestinal: Negative for constipation, diarrhea and nausea.   Genitourinary: Negative for dysuria and hematuria.   Musculoskeletal: Negative for arthralgias and myalgias.   Neurological: Negative for seizures and syncope.   Hematological: Negative for adenopathy. Does not bruise/bleed easily.   Psychiatric/Behavioral: Negative for confusion.              Vitals:    05/11/18 1049   BP: 130/90   Pulse: 74   Resp: 16   Temp: 97.6 °F (36.4 °C)   SpO2: 98%  Comment: at rest   Weight: 86.3 kg (190 lb 3.2 oz)   Height: 172.7 cm (68\")  Comment: new ht w/shoes   PainSc: 0-No pain     Current Status 5/11/2018   ECOG score 0      " PHYSICAL EXAM:      CONSTITUTIONAL:  Vital signs reviewed.  No distress, looks comfortable.  EYES:  Conjunctiva and lids unremarkable.  PERRLA  EARS,NOSE,MOUTH,THROAT:  Ears and nose appear unremarkable.  Lips, teeth, gums appear unremarkable.  RESPIRATORY:  Normal respiratory effort.  Lungs clear to auscultation bilaterally.  CARDIOVASCULAR:  Normal S1, S2.  No murmurs rubs or gallops.  No significant lower extremity edema.  GASTROINTESTINAL: Abdomen appears unremarkable.  Nontender.  No hepatomegaly.  No splenomegaly.  LYMPHATIC:  No cervical, supraclavicular, axillary lymphadenopathy.  MUSCULOSKELETAL:  Unremarkable gait and station.  Unremarkable digits/nails.  No cyanosis or clubbing.  SKIN:  Warm.  No rashes.  PSYCHIATRIC:  Normal judgment and insight.  Normal mood and affect.       RECENT LABS:        WBC   Date Value Ref Range Status   05/11/2018 7.03 4.00 - 10.00 10*3/mm3 Final   03/22/2018 5.99 4.50 - 10.70 10*3/mm3 Final   11/02/2017 4.56 4.50 - 10.70 10*3/mm3 Final   10/23/2017 7.02 4.50 - 10.70 10*3/mm3 Final   10/06/2017 4.31 (L) 4.50 - 10.70 10*3/mm3 Final   08/24/2017 7.98 4.50 - 10.70 10*3/mm3 Final   08/18/2017 6.33 4.50 - 10.70 10*3/mm3 Final   04/18/2017 6.76 4.50 - 10.70 10*3/mm3 Final   04/17/2015 5.37 4.50 - 10.70 K/Cumm Final   01/19/2015 4.92 4.50 - 10.70 K/Cumm Final     Hemoglobin   Date Value Ref Range Status   05/11/2018 14.3 13.5 - 16.5 g/dL Final   05/04/2018 13.4 (L) 13.7 - 17.6 g/dL Final   03/22/2018 13.5 (L) 13.7 - 17.6 g/dL Final   11/02/2017 12.4 (L) 13.7 - 17.6 g/dL Final   10/23/2017 12.6 (L) 13.7 - 17.6 g/dL Final   10/06/2017 12.7 (L) 13.7 - 17.6 g/dL Final   08/24/2017 14.2 13.7 - 17.6 g/dL Final   08/18/2017 13.4 (L) 13.7 - 17.6 g/dL Final   04/18/2017 13.7 13.7 - 17.6 g/dL Final   04/17/2015 14.0 13.7 - 17.6 g/dL Final   01/19/2015 14.7 13.7 - 17.6 g/dL Final     Platelets   Date Value Ref Range Status   05/11/2018 107 (L) 150 - 375 10*3/mm3 Final   05/04/2018 97 (L) 140 -  500 10*3/mm3 Final   03/22/2018 114 (L) 140 - 500 10*3/mm3 Final   11/02/2017 112 (L) 140 - 500 10*3/mm3 Final   10/23/2017 111 (L) 140 - 500 10*3/mm3 Final   10/06/2017 97 (L) 140 - 500 10*3/mm3 Final   08/24/2017 71 (L) 140 - 500 10*3/mm3 Final   08/18/2017 103 (L) 140 - 500 10*3/mm3 Final   04/18/2017 137 (L) 140 - 500 10*3/mm3 Final   04/17/2015 102 (L) 140 - 500 K/Cumm Final   01/19/2015 100 (L) 140 - 500 K/Cumm Final       Assessment/Plan   There are no diagnoses linked to this encounter.  *Thrombocytopenia.  PLT  since at least January 2015.  Liver normal on ultrasound 5/30/17.  INR 1.1 on 8/18/17.  Check thrombocytopenia labs.    *Anemia.  Mild.  Hb up to normal today.  Check anemia labs.    *Chronic hepatitis C which was cured with Epclusa x 12 weeks, bleeding therapy around 12/11/17.    *Allergic rhinitis.  Recommended smell energy medicines as below.  Recommended he consider an allergy referral.  Allergies prevent him from exercising.  They have been getting worse in recent years.    *Overweight.  He exercises regularly (hasn't done it lately due to an allergy flare usually exercises regularly).  However, he does not make healthy food choices.  I recommended he worked to clamp his diet.  Obesity can lead to fatty liver changes which can lead to further thrombocytopenia.    *Coronary artery disease with previous stents.  On aspirin 81 mg daily.  If platelets fall to significantly below 50, may need to consider stopping aspirin.  Fortunately, PLT has been relatively stable.    Plan  · Check thrombocytopenia labs  · Check anemia labs  · (Check labs in about 2 months when he has insurance again as he is currently between insurance plans).  · Begin scheduled Xyzal or Zyrtec, scheduled Nasonex and saline nose spray and Afrin if needed (I explained  only use Afrin 3 days in a row)  · Appointment with me 1 week after lab work.    Peripheral smear personally reviewed by me.

## 2018-05-11 ENCOUNTER — APPOINTMENT (OUTPATIENT)
Dept: LAB | Facility: HOSPITAL | Age: 53
End: 2018-05-11

## 2018-05-11 ENCOUNTER — CONSULT (OUTPATIENT)
Dept: ONCOLOGY | Facility: CLINIC | Age: 53
End: 2018-05-11

## 2018-05-11 VITALS
SYSTOLIC BLOOD PRESSURE: 130 MMHG | RESPIRATION RATE: 16 BRPM | HEIGHT: 68 IN | OXYGEN SATURATION: 98 % | WEIGHT: 190.2 LBS | BODY MASS INDEX: 28.82 KG/M2 | TEMPERATURE: 97.6 F | HEART RATE: 74 BPM | DIASTOLIC BLOOD PRESSURE: 90 MMHG

## 2018-05-11 DIAGNOSIS — D64.9 ANEMIA, UNSPECIFIED TYPE: ICD-10-CM

## 2018-05-11 DIAGNOSIS — D69.6 THROMBOCYTOPENIA (HCC): Primary | ICD-10-CM

## 2018-05-11 LAB
BASOPHILS # BLD AUTO: 0.05 10*3/MM3 (ref 0–0.1)
BASOPHILS NFR BLD AUTO: 0.7 % (ref 0–1.1)
DEPRECATED RDW RBC AUTO: 35.8 FL (ref 37–49)
EOSINOPHIL # BLD AUTO: 0.32 10*3/MM3 (ref 0–0.36)
EOSINOPHIL NFR BLD AUTO: 4.6 % (ref 1–5)
ERYTHROCYTE [DISTWIDTH] IN BLOOD BY AUTOMATED COUNT: 11.9 % (ref 11.7–14.5)
HCT VFR BLD AUTO: 41.4 % (ref 40–49)
HGB BLD-MCNC: 14.3 G/DL (ref 13.5–16.5)
IMM GRANULOCYTES # BLD: 0.02 10*3/MM3 (ref 0–0.03)
IMM GRANULOCYTES NFR BLD: 0.3 % (ref 0–0.5)
LYMPHOCYTES # BLD AUTO: 3.16 10*3/MM3 (ref 1–3.5)
LYMPHOCYTES NFR BLD AUTO: 45 % (ref 20–49)
MCH RBC QN AUTO: 28.9 PG (ref 27–33)
MCHC RBC AUTO-ENTMCNC: 34.5 G/DL (ref 32–35)
MCV RBC AUTO: 83.8 FL (ref 83–97)
MONOCYTES # BLD AUTO: 0.65 10*3/MM3 (ref 0.25–0.8)
MONOCYTES NFR BLD AUTO: 9.2 % (ref 4–12)
NEUTROPHILS # BLD AUTO: 2.83 10*3/MM3 (ref 1.5–7)
NEUTROPHILS NFR BLD AUTO: 40.2 % (ref 39–75)
NRBC BLD MANUAL-RTO: 0 /100 WBC (ref 0–0)
PLATELET # BLD AUTO: 107 10*3/MM3 (ref 150–375)
PMV BLD AUTO: 9.3 FL (ref 8.9–12.1)
RBC # BLD AUTO: 4.94 10*6/MM3 (ref 4.3–5.5)
WBC NRBC COR # BLD: 7.03 10*3/MM3 (ref 4–10)

## 2018-05-11 PROCEDURE — 36416 COLLJ CAPILLARY BLOOD SPEC: CPT | Performed by: INTERNAL MEDICINE

## 2018-05-11 PROCEDURE — 99245 OFF/OP CONSLTJ NEW/EST HI 55: CPT | Performed by: INTERNAL MEDICINE

## 2018-05-11 PROCEDURE — 85025 COMPLETE CBC W/AUTO DIFF WBC: CPT | Performed by: INTERNAL MEDICINE

## 2018-05-15 NOTE — TELEPHONE ENCOUNTER
Please call and check and see how he feels today, what has changed since last week. Then we will decide what to do. Thanks

## 2018-05-16 DIAGNOSIS — T78.40XA ALLERGIC STATE, INITIAL ENCOUNTER: Primary | ICD-10-CM

## 2018-07-06 RX ORDER — PITAVASTATIN CALCIUM 2.09 MG/1
TABLET, FILM COATED ORAL
Qty: 15 TABLET | Refills: 0 | Status: SHIPPED | OUTPATIENT
Start: 2018-07-06 | End: 2018-08-03 | Stop reason: SDUPTHER

## 2018-07-17 ENCOUNTER — APPOINTMENT (OUTPATIENT)
Dept: LAB | Facility: HOSPITAL | Age: 53
End: 2018-07-17

## 2018-08-06 RX ORDER — PITAVASTATIN CALCIUM 2.09 MG/1
TABLET, FILM COATED ORAL
Qty: 15 TABLET | Refills: 0 | Status: SHIPPED | OUTPATIENT
Start: 2018-08-06 | End: 2018-09-04 | Stop reason: SDUPTHER

## 2018-09-05 RX ORDER — PITAVASTATIN CALCIUM 2.09 MG/1
TABLET, FILM COATED ORAL
Qty: 15 TABLET | Refills: 0 | Status: SHIPPED | OUTPATIENT
Start: 2018-09-05 | End: 2018-10-11 | Stop reason: SDUPTHER

## 2018-10-12 RX ORDER — PITAVASTATIN CALCIUM 2.09 MG/1
TABLET, FILM COATED ORAL
Qty: 15 TABLET | Refills: 0 | Status: SHIPPED | OUTPATIENT
Start: 2018-10-12 | End: 2018-11-06 | Stop reason: SDUPTHER

## 2018-11-06 RX ORDER — PITAVASTATIN CALCIUM 2.09 MG/1
TABLET, FILM COATED ORAL
Qty: 15 TABLET | Refills: 0 | Status: SHIPPED | OUTPATIENT
Start: 2018-11-06 | End: 2018-12-12 | Stop reason: SDUPTHER

## 2018-12-13 RX ORDER — PITAVASTATIN CALCIUM 2.09 MG/1
TABLET, FILM COATED ORAL
Qty: 15 TABLET | Refills: 0 | Status: SHIPPED | OUTPATIENT
Start: 2018-12-13 | End: 2019-01-08 | Stop reason: SDUPTHER

## 2019-01-09 RX ORDER — PITAVASTATIN CALCIUM 2.09 MG/1
TABLET, FILM COATED ORAL
Qty: 15 TABLET | Refills: 0 | Status: SHIPPED | OUTPATIENT
Start: 2019-01-09 | End: 2019-02-07 | Stop reason: SDUPTHER

## 2019-02-07 RX ORDER — PITAVASTATIN CALCIUM 2.09 MG/1
TABLET, FILM COATED ORAL
Qty: 15 TABLET | Refills: 0 | Status: SHIPPED | OUTPATIENT
Start: 2019-02-07 | End: 2019-03-07 | Stop reason: SDUPTHER

## 2019-03-07 RX ORDER — PITAVASTATIN CALCIUM 2.09 MG/1
TABLET, FILM COATED ORAL
Qty: 15 TABLET | Refills: 0 | Status: SHIPPED | OUTPATIENT
Start: 2019-03-07 | End: 2019-04-04 | Stop reason: SDUPTHER

## 2019-04-04 RX ORDER — PITAVASTATIN CALCIUM 2.09 MG/1
TABLET, FILM COATED ORAL
Qty: 15 TABLET | Refills: 0 | Status: SHIPPED | OUTPATIENT
Start: 2019-04-04 | End: 2019-05-08 | Stop reason: SDUPTHER

## 2019-04-17 ENCOUNTER — TELEPHONE (OUTPATIENT)
Dept: CARDIOLOGY | Facility: CLINIC | Age: 54
End: 2019-04-17

## 2019-04-17 NOTE — TELEPHONE ENCOUNTER
Patient did not show up for his follow-up appointment today.  Please see if he would like to reschedule his yearly follow-up appointment with ALINE, or if he is not having any issues he can reschedule it with Dr. Robertson though it may be a couple of months from now.

## 2019-05-08 RX ORDER — PITAVASTATIN CALCIUM 2.09 MG/1
TABLET, FILM COATED ORAL
Qty: 15 TABLET | Refills: 0 | Status: SHIPPED | OUTPATIENT
Start: 2019-05-08 | End: 2019-06-09 | Stop reason: SDUPTHER

## 2019-06-10 RX ORDER — PITAVASTATIN CALCIUM 2.09 MG/1
TABLET, FILM COATED ORAL
Qty: 15 TABLET | Refills: 0 | Status: SHIPPED | OUTPATIENT
Start: 2019-06-10 | End: 2019-07-08 | Stop reason: SDUPTHER

## 2019-07-08 ENCOUNTER — TELEPHONE (OUTPATIENT)
Dept: SPORTS MEDICINE | Facility: CLINIC | Age: 54
End: 2019-07-08

## 2019-07-08 RX ORDER — PITAVASTATIN CALCIUM 2.09 MG/1
TABLET, FILM COATED ORAL
Qty: 15 TABLET | Refills: 0 | Status: SHIPPED | OUTPATIENT
Start: 2019-07-08 | End: 2023-01-24 | Stop reason: SDUPTHER

## 2019-07-08 NOTE — TELEPHONE ENCOUNTER
PA preformed on 7/08/19 unable to send to plan due to inactive insurance plan     livalo 2mg tablets

## 2019-07-08 NOTE — TELEPHONE ENCOUNTER
Okay to refill 1 month supply.  Please inform patient he is due for annual physical exam with me.  We will not be able to refill again until after he has a physical as it has been over 1 year since his last visit.

## 2019-07-23 ENCOUNTER — TELEPHONE (OUTPATIENT)
Dept: SPORTS MEDICINE | Facility: CLINIC | Age: 54
End: 2019-07-23

## 2019-07-24 ENCOUNTER — TELEPHONE (OUTPATIENT)
Dept: SPORTS MEDICINE | Facility: CLINIC | Age: 54
End: 2019-07-24

## 2019-07-24 NOTE — TELEPHONE ENCOUNTER
Pt Bert QUACHNeill insurance company [employer] faxed over prior authorization  form for livalo 2 mg  Pt has not been seen in a year denied called and informed pt he needed an appointment  left

## 2019-08-29 RX ORDER — PITAVASTATIN CALCIUM 2.09 MG/1
TABLET, FILM COATED ORAL
Qty: 15 TABLET | Refills: 0 | OUTPATIENT
Start: 2019-08-29

## 2023-01-24 ENCOUNTER — OFFICE VISIT (OUTPATIENT)
Dept: INTERNAL MEDICINE | Facility: CLINIC | Age: 58
End: 2023-01-24
Payer: COMMERCIAL

## 2023-01-24 VITALS
WEIGHT: 183 LBS | HEART RATE: 77 BPM | SYSTOLIC BLOOD PRESSURE: 142 MMHG | OXYGEN SATURATION: 97 % | DIASTOLIC BLOOD PRESSURE: 80 MMHG | HEIGHT: 68 IN | BODY MASS INDEX: 27.74 KG/M2 | TEMPERATURE: 98.3 F

## 2023-01-24 DIAGNOSIS — E78.2 MIXED HYPERLIPIDEMIA: Primary | ICD-10-CM

## 2023-01-24 DIAGNOSIS — D64.9 ANEMIA, UNSPECIFIED TYPE: ICD-10-CM

## 2023-01-24 DIAGNOSIS — Z00.00 ENCOUNTER FOR MEDICAL EXAMINATION TO ESTABLISH CARE: ICD-10-CM

## 2023-01-24 DIAGNOSIS — M54.2 CERVICALGIA: ICD-10-CM

## 2023-01-24 PROCEDURE — 99204 OFFICE O/P NEW MOD 45 MIN: CPT

## 2023-01-24 NOTE — PROGRESS NOTES
"Chief Complaint  Establish Care    Subjective        Bert Augustin presents to [facility]  History of Present Illness   57 y.o. male presenting with hyperlipidemia and to establish care. Recently moved back to the area from [place]. Stents in 2014 and taking medications for cholesterol since.  Has tried multiple statins but felt \"he had been hit by a bus\".  Has found that Livalo is the only medication that tends to help with cholesterol.  Has a bulging disc in his neck and has been taking diclofenac for it.  No other health concerns at this time.      Objective   Vital Signs:  /80   Pulse 77   Temp 98.3 °F (36.8 °C)   Ht 172.7 cm (68\")   Wt 83 kg (183 lb)   SpO2 97%   BMI 27.83 kg/m²   Estimated body mass index is 27.83 kg/m² as calculated from the following:    Height as of this encounter: 172.7 cm (68\").    Weight as of this encounter: 83 kg (183 lb).             Physical Exam  Vitals reviewed.   Constitutional:       Appearance: Normal appearance.   Musculoskeletal:         General: Normal range of motion.      Cervical back: Normal range of motion. No rigidity or tenderness.   Skin:     General: Skin is warm and dry.      Capillary Refill: Capillary refill takes less than 2 seconds.   Neurological:      General: No focal deficit present.      Mental Status: He is alert and oriented to person, place, and time.   Psychiatric:         Mood and Affect: Mood normal.         Behavior: Behavior normal.         Thought Content: Thought content normal.         Judgment: Judgment normal.        Result Review :        Current Outpatient Medications on File Prior to Visit   Medication Sig Dispense Refill   • aspirin 81 MG tablet Take  by mouth.     • [DISCONTINUED] diclofenac (VOLTAREN) 50 MG EC tablet TAKE 1 TABLET BY MOUTH TWICE DAILY AS NEEDED FOR PAIN 30 tablet 0   • [DISCONTINUED] LIVALO 2 MG tablet tablet TAKE 1/2 TABLET BY MOUTH EVERY DAY 15 tablet 0   • [DISCONTINUED] " tiZANidine (ZANAFLEX) 4 MG tablet Take 1 tablet by mouth Every 8 (Eight) Hours As Needed for Muscle Spasms. 30 tablet 0     No current facility-administered medications on file prior to visit.                    Assessment and Plan   Diagnoses and all orders for this visit:    1. Mixed hyperlipidemia (Primary)  -     Cancel: Comprehensive Metabolic Panel  -     Cancel: Lipid Panel With / Chol / HDL Ratio  -     Cancel: Urinalysis With Microscopic If Indicated (No Culture) - Urine, Clean Catch  -     pitavastatin calcium (Livalo) 2 MG tablet tablet; Take 0.5 tablets by mouth Daily.  Dispense: 15 tablet; Refill: 1    2. Anemia, unspecified type  -     Cancel: CBC & Differential    3. Encounter for medical examination to establish care  -     Cancel: Comprehensive Metabolic Panel  -     Cancel: CBC & Differential  -     Cancel: Lipid Panel With / Chol / HDL Ratio  -     Cancel: Urinalysis With Microscopic If Indicated (No Culture) - Urine, Clean Catch  -     Cancel: TSH Rfx On Abnormal To Free T4  -     Comprehensive Metabolic Panel; Future  -     CBC & Differential; Future  -     Lipid Panel With / Chol / HDL Ratio; Future  -     TSH Rfx On Abnormal To Free T4; Future  -     Urinalysis With Microscopic If Indicated (No Culture) - Urine, Clean Catch; Future    4. Cervicalgia  -     diclofenac (VOLTAREN) 50 MG EC tablet; Take 1 tablet by mouth twice daily as needed for pain  Dispense: 30 tablet; Refill: 2      Patient Instructions   Refills sent medications to pharmacy. Labs to be completed between 3/1 and 4/30. Follow-up for annual exam in 3 months.            Follow Up   Return in about 3 months (around 4/24/2023) for Annual physical.  Patient was given instructions and counseling regarding his condition or for health maintenance advice. Please see specific information pulled into the AVS if appropriate.

## 2023-01-24 NOTE — PATIENT INSTRUCTIONS
Refills sent medications to pharmacy. Labs to be completed between 3/1 and 4/30. Follow-up for annual exam in 3 months.

## 2023-01-26 ENCOUNTER — PATIENT ROUNDING (BHMG ONLY) (OUTPATIENT)
Dept: INTERNAL MEDICINE | Facility: CLINIC | Age: 58
End: 2023-01-26
Payer: COMMERCIAL

## 2023-02-01 ENCOUNTER — TELEPHONE (OUTPATIENT)
Dept: INTERNAL MEDICINE | Facility: CLINIC | Age: 58
End: 2023-02-01

## 2023-02-01 NOTE — TELEPHONE ENCOUNTER
PATIENT IS CALLING IN HE STATES THAT PHARMACY IS NEEDING MORE INFORMATION ON THE LIVALO, HE STATES HE CAN NOT TAKE THE GENERIC ON THIS AND HAS TO HAVE NAME BRAND AND THEY ARE WAITING ON APPROVAL FROM MARLY.    CONFIRMED PHARMACY  [id number] - [place], [place] - [address] AT [facility] [address] - [phone]  - [phone] FX

## 2023-02-02 DIAGNOSIS — E78.2 MIXED HYPERLIPIDEMIA: ICD-10-CM

## 2023-02-06 ENCOUNTER — TELEPHONE (OUTPATIENT)
Dept: INTERNAL MEDICINE | Facility: CLINIC | Age: 58
End: 2023-02-06
Payer: COMMERCIAL

## 2023-02-06 NOTE — TELEPHONE ENCOUNTER
Pharmacy Name: [facility] - [facility] [address] [place] [address] [phone] [facility] [phone]      Pharmacy representative name: RACHEL    Pharmacy representative phone number: [phone] 8-5:30 EST REF # [id number]    What medication are you calling in regards to: pitavastatin calcium (Livalo) 2 MG tablet tablet    What question does the pharmacy have: pitavastatin calcium (Livalo) 2 MG tablet tablet IS NO LONGER COVERED BY THE PATIENT'S INSURANCE AND THE PATIENT IS ASKING IF HE CAN TRY ANY OF THESE ALTERNATIVES:    PRAVASTATIN    SIMVASTATIN    ATORVASTATIN    ROSUVASTATIN    PLEASE ADVISE PHARMACY ASAP      Who is the provider that prescribed the medication: MARLY ZHAO    Additional notes: PLEASE ADVISE PHARMACY REGARDING POSSIBLE CHANGE IN MEDICATION ASAP

## 2023-02-13 DIAGNOSIS — E78.2 MIXED HYPERLIPIDEMIA: ICD-10-CM

## 2023-03-27 ENCOUNTER — OFFICE VISIT (OUTPATIENT)
Dept: INTERNAL MEDICINE | Facility: CLINIC | Age: 58
End: 2023-03-27
Payer: COMMERCIAL

## 2023-03-27 VITALS
HEART RATE: 83 BPM | HEIGHT: 68 IN | SYSTOLIC BLOOD PRESSURE: 132 MMHG | BODY MASS INDEX: 28.34 KG/M2 | OXYGEN SATURATION: 99 % | DIASTOLIC BLOOD PRESSURE: 80 MMHG | WEIGHT: 187 LBS

## 2023-03-27 DIAGNOSIS — R68.89: Primary | ICD-10-CM

## 2023-03-27 PROCEDURE — 99213 OFFICE O/P EST LOW 20 MIN: CPT

## 2023-03-27 NOTE — PROGRESS NOTES
"Chief Complaint  Discoloration of tongue    Subjective        Bert Augustin presents to [facility]  History of Present Illness  57-year-old male presenting with blue coated tongue.  On Saturday morning woke up and had a \"Smurf colored tongue\".  After about an hour, the coating went away.  No tongue color changes throughout the day.  Coating reappeared in the morning on Sunday.  Also appeared this morning to a lesser extent.  Has not been treating with anything.  Denies changes in diet, medications, recent illness, pain.  States he is a mouth breather at night.  Brushes teeth regularly.        Objective   Vital Signs:  /80   Pulse 83   Ht 172.7 cm (68\")   Wt 84.8 kg (187 lb)   SpO2 99%   BMI 28.43 kg/m²   Estimated body mass index is 28.43 kg/m² as calculated from the following:    Height as of this encounter: 172.7 cm (68\").    Weight as of this encounter: 84.8 kg (187 lb).             Physical Exam  Vitals reviewed.   Constitutional:       Appearance: Normal appearance.   HENT:      Mouth/Throat:      Lips: Pink.      Mouth: Mucous membranes are moist. No injury or oral lesions.      Dentition: Normal dentition. No dental tenderness.      Tongue: No lesions. Tongue does not deviate from midline.      Palate: No mass.   Musculoskeletal:         General: Normal range of motion.      Cervical back: Normal range of motion.   Skin:     General: Skin is warm and dry.      Capillary Refill: Capillary refill takes less than 2 seconds.   Neurological:      General: No focal deficit present.      Mental Status: He is alert and oriented to person, place, and time.   Psychiatric:         Mood and Affect: Mood normal.         Behavior: Behavior normal.         Thought Content: Thought content normal.         Judgment: Judgment normal.        Result Review :        Current Outpatient Medications on File Prior to Visit   Medication Sig Dispense Refill   • aspirin 81 MG tablet Take  by " mouth.     • diclofenac (VOLTAREN) 50 MG EC tablet Take 1 tablet by mouth twice daily as needed for pain 30 tablet 2   • pitavastatin calcium (Livalo) 2 MG tablet tablet Take 0.5 tablets by mouth Daily. 45 tablet 2     No current facility-administered medications on file prior to visit.                    Assessment and Plan   Diagnoses and all orders for this visit:    1. Blue tongue (Primary)      Patient Instructions   Recommend mouthwash prior to bed to lessen bacterial growth. Recommend vaporizer/humidifier in bedroom overnight. Stay hydrated with water. Follow-up as needed.            Follow Up   Return if symptoms worsen or fail to improve.  Patient was given instructions and counseling regarding his condition or for health maintenance advice. Please see specific information pulled into the AVS if appropriate.

## 2023-03-27 NOTE — PATIENT INSTRUCTIONS
Recommend mouthwash prior to bed to lessen bacterial growth. Recommend vaporizer/humidifier in bedroom overnight. Stay hydrated with water. Follow-up as needed.

## 2023-04-23 DIAGNOSIS — M54.2 CERVICALGIA: ICD-10-CM

## 2023-04-27 ENCOUNTER — OFFICE VISIT (OUTPATIENT)
Dept: INTERNAL MEDICINE | Facility: CLINIC | Age: 58
End: 2023-04-27
Payer: COMMERCIAL

## 2023-04-27 VITALS
HEART RATE: 77 BPM | SYSTOLIC BLOOD PRESSURE: 92 MMHG | OXYGEN SATURATION: 96 % | HEIGHT: 68 IN | BODY MASS INDEX: 28.85 KG/M2 | TEMPERATURE: 97.7 F | WEIGHT: 190.4 LBS | DIASTOLIC BLOOD PRESSURE: 65 MMHG

## 2023-04-27 DIAGNOSIS — M54.2 NECK PAIN: Primary | ICD-10-CM

## 2023-04-27 DIAGNOSIS — I25.10 CORONARY ARTERY DISEASE INVOLVING NATIVE CORONARY ARTERY OF NATIVE HEART WITHOUT ANGINA PECTORIS: ICD-10-CM

## 2023-04-27 DIAGNOSIS — Z00.00 ANNUAL PHYSICAL EXAM: ICD-10-CM

## 2023-04-27 NOTE — PROGRESS NOTES
"Chief Complaint  Annual Exam    Subjective        Bert Augustin presents to [facility]  History of Present Illness    57-year-old male presenting with neck pain, loss of smell post COVID and annual exam.  Has been taking diclofenac for his neck which seems to be helping.  Had COVID almost 2 years ago and lost sense of smell.  Has tried different home remedies that have not improved his sense of smell yet.  Patient would be interested in any pharmaceutical fixes in the future.  Patient has coronary artery disease and may need a referral back to Dr. Robertson.      Objective   Vital Signs:  BP 92/65 (BP Location: Left arm, Patient Position: Sitting, Cuff Size: Large Adult)   Pulse 77   Temp 97.7 °F (36.5 °C) (Oral)   Ht 172.7 cm (68\")   Wt 86.4 kg (190 lb 6.4 oz)   SpO2 96%   BMI 28.95 kg/m²   Estimated body mass index is 28.95 kg/m² as calculated from the following:    Height as of this encounter: 172.7 cm (68\").    Weight as of this encounter: 86.4 kg (190 lb 6.4 oz).             Physical Exam  Vitals reviewed.   Constitutional:       Appearance: Normal appearance.   HENT:      Head: Normocephalic.      Right Ear: Tympanic membrane normal.      Left Ear: Tympanic membrane normal.      Nose: Nose normal.      Mouth/Throat:      Mouth: Mucous membranes are moist.      Pharynx: Oropharynx is clear.   Eyes:      Pupils: Pupils are equal, round, and reactive to light.   Cardiovascular:      Rate and Rhythm: Normal rate.      Pulses: Normal pulses.      Heart sounds: Normal heart sounds.   Pulmonary:      Effort: Pulmonary effort is normal.      Breath sounds: Normal breath sounds.   Abdominal:      General: Bowel sounds are normal.      Palpations: Abdomen is soft.   Musculoskeletal:         General: Normal range of motion.      Cervical back: Normal range of motion.   Skin:     General: Skin is warm and dry.      Capillary Refill: Capillary refill takes less than 2 seconds. "   Neurological:      General: No focal deficit present.      Mental Status: He is alert and oriented to person, place, and time.   Psychiatric:         Mood and Affect: Mood normal.         Behavior: Behavior normal.         Thought Content: Thought content normal.         Judgment: Judgment normal.        Result Review :    Common labs        4/20/2023    11:07   Common Labs   Glucose 106     BUN 11     Creatinine 0.87     Sodium 144     Potassium 4.6     Chloride 108     Calcium 9.2     Total Protein 7.5     Albumin 4.7     Total Bilirubin 1.1     Alkaline Phosphatase 45     AST (SGOT) 20     ALT (SGPT) 20     WBC 4.34     Hemoglobin 14.4     Hematocrit 41.5     Platelets 106     Total Cholesterol 150     Triglycerides 74     HDL Cholesterol 47     LDL Cholesterol  89           Current Outpatient Medications on File Prior to Visit   Medication Sig Dispense Refill   • aspirin 81 MG tablet Take  by mouth.     • diclofenac (VOLTAREN) 50 MG EC tablet TAKE 1 TABLET BY MOUTH TWICE DAILY AS NEEDED FOR PAIN 30 tablet 2   • pitavastatin calcium (Livalo) 2 MG tablet tablet Take 0.5 tablets by mouth Daily. 45 tablet 2     No current facility-administered medications on file prior to visit.              Assessment and Plan   Diagnoses and all orders for this visit:    1. Neck pain (Primary)    2. Annual physical exam    3. Coronary artery disease involving native coronary artery of native heart without angina pectoris      Patient Instructions   Continue medications as prescribed. Monitor for dizziness, lightheadedness and headaches. Stay active. Stay hydrated with water. Contact Dr. Robertson and notify if new referral needed. Follow-up in 6 months for recheck.              Follow Up   Return in about 6 months (around 10/27/2023) for Recheck hyperlipidemia, Recheck.  Patient was given instructions and counseling regarding his condition or for health maintenance advice. Please see specific information pulled into the AVS if  appropriate.

## 2023-04-27 NOTE — PATIENT INSTRUCTIONS
Continue medications as prescribed. Monitor for dizziness, lightheadedness and headaches. Stay active. Stay hydrated with water. Contact Dr. Robertson and notify if new referral needed. Follow-up in 6 months for recheck.

## 2023-05-31 DIAGNOSIS — M54.2 CERVICALGIA: ICD-10-CM

## 2023-05-31 NOTE — TELEPHONE ENCOUNTER
Rx Refill Note  Requested Prescriptions      No prescriptions requested or ordered in this encounter      Last office visit with prescribing clinician: 4/27/2023   Last telemedicine visit with prescribing clinician: Visit date not found   Next office visit with prescribing clinician: 10/31/2023                         Would you like a call back once the refill request has been completed: [] Yes [] No    If the office needs to give you a call back, can they leave a voicemail: [] Yes [] No    Caitlin Seals  05/31/23, 07:56 EDT

## 2023-07-27 DIAGNOSIS — M54.2 CERVICALGIA: ICD-10-CM

## 2023-07-27 NOTE — TELEPHONE ENCOUNTER
Caller: Bert Augustin    Relationship: Self    Best call back number: [phone]    Requested Prescriptions:   Requested Prescriptions     Pending Prescriptions Disp Refills    diclofenac (VOLTAREN) 50 MG EC tablet 30 tablet 2     Sig: Take 1 tablet by mouth 2 (Two) Times a Day. for pain        Pharmacy where request should be sent: [id number] [place] [facility] [address] AT [facility] [phone] [facility] [phone]      Last office visit with prescribing clinician: 4/27/2023   Last telemedicine visit with prescribing clinician: Visit date not found   Next office visit with prescribing clinician: 10/31/2023     Additional details provided by patient: PATIENT IS OUT OF THIS MEDICATION.       Does the patient have less than a 3 day supply:  [x] Yes  [] No    Would you like a call back once the refill request has been completed: [x] Yes [] No    If the office needs to give you a call back, can they leave a voicemail: [x] Yes [] No    Daphney Harmon Rep   07/27/23 13:40 EDT

## 2023-10-31 ENCOUNTER — OFFICE VISIT (OUTPATIENT)
Dept: INTERNAL MEDICINE | Facility: CLINIC | Age: 58
End: 2023-10-31
Payer: COMMERCIAL

## 2023-10-31 VITALS
DIASTOLIC BLOOD PRESSURE: 78 MMHG | BODY MASS INDEX: 29.25 KG/M2 | HEIGHT: 68 IN | WEIGHT: 193 LBS | SYSTOLIC BLOOD PRESSURE: 122 MMHG | HEART RATE: 78 BPM | OXYGEN SATURATION: 98 %

## 2023-10-31 DIAGNOSIS — M54.2 CERVICALGIA: ICD-10-CM

## 2023-10-31 DIAGNOSIS — Z71.6 ENCOUNTER FOR SMOKING CESSATION COUNSELING: Primary | ICD-10-CM

## 2023-10-31 RX ORDER — VARENICLINE TARTRATE 1 MG/1
1 TABLET, FILM COATED ORAL 2 TIMES DAILY
Qty: 56 TABLET | Refills: 1 | Status: SHIPPED | OUTPATIENT
Start: 2023-11-28 | End: 2024-01-23

## 2023-10-31 RX ORDER — VARENICLINE TARTRATE 0.5 (11)-1
KIT ORAL
Qty: 1 EACH | Refills: 0 | Status: SHIPPED | OUTPATIENT
Start: 2023-10-31 | End: 2023-11-28

## 2023-10-31 NOTE — PATIENT INSTRUCTIONS
Continue medications as prescribed. Chantix as prescribed. Stay active. Stay hydrated with water. Recommend COVID booster and flu shot this season. Follow-up in 6 months for annual and fasting labs.

## 2023-10-31 NOTE — PROGRESS NOTES
"Chief Complaint  Follow-up and Hyperlipidemia    Subjective        Bert Augustin presents to [facility]  History of Present Illness  58-year-old male presenting with hyperlipidemia follow-up and smoking sensation encounter.  Patient has been taking his Livalo as prescribed.  Tolerating well.  Previously smoked cigarettes but stopped by taking Chantix.  Has been smoke-free for 12 years.  Started picking up cigar smoking in the past year.  He has increased his smoking a cigar once or twice a day.  Patient would like to quit but is unsure of best way.  Hyperlipidemia        Objective   Vital Signs:  /78 (BP Location: Right arm, Patient Position: Sitting, Cuff Size: Adult)   Pulse 78   Ht 172.2 cm (67.8\")   Wt 87.5 kg (193 lb)   SpO2 98%   BMI 29.52 kg/m²   Estimated body mass index is 29.52 kg/m² as calculated from the following:    Height as of this encounter: 172.2 cm (67.8\").    Weight as of this encounter: 87.5 kg (193 lb).       BMI is >= 25 and <30. (Overweight) The following options were offered after discussion;: exercise counseling/recommendations and nutrition counseling/recommendations      Physical Exam  Vitals reviewed.   Constitutional:       Appearance: Normal appearance.   Musculoskeletal:         General: Normal range of motion.      Cervical back: Normal range of motion.   Skin:     General: Skin is warm and dry.      Capillary Refill: Capillary refill takes less than 2 seconds.   Neurological:      General: No focal deficit present.      Mental Status: He is alert and oriented to person, place, and time.   Psychiatric:         Mood and Affect: Mood normal.         Behavior: Behavior normal.         Thought Content: Thought content normal.         Judgment: Judgment normal.        Result Review :    Common labs          4/20/2023    11:07   Common Labs   Glucose 106    BUN 11    Creatinine 0.87    Sodium 144    Potassium 4.6    Chloride 108    Calcium 9.2  "   Total Protein 7.5    Albumin 4.7    Total Bilirubin 1.1    Alkaline Phosphatase 45    AST (SGOT) 20    ALT (SGPT) 20    WBC 4.34    Hemoglobin 14.4    Hematocrit 41.5    Platelets 106    Total Cholesterol 150    Triglycerides 74    HDL Cholesterol 47    LDL Cholesterol  89          Current Outpatient Medications on File Prior to Visit   Medication Sig Dispense Refill    aspirin 81 MG tablet Take  by mouth.      pitavastatin calcium (Livalo) 2 MG tablet tablet Take 0.5 tablets by mouth Daily. 45 tablet 2    [DISCONTINUED] diclofenac (VOLTAREN) 50 MG EC tablet Take 1 tablet by mouth 2 (Two) Times a Day. for pain 60 tablet 2     No current facility-administered medications on file prior to visit.              Assessment and Plan   Diagnoses and all orders for this visit:    1. Encounter for smoking cessation counseling (Primary)  -     Varenicline Tartrate, Starter, 0.5 MG X 11 & 1 MG X 42 tablet therapy pack; Take 0.5 mg by mouth Daily for 3 days, THEN 0.5 mg 2 (Two) Times a Day for 4 days, THEN 1 mg 2 (Two) Times a Day for 21 days. Take 0.5 mg po daily x 3 days, then 0.5 mg po bid x 4 days, then 1 mg po bid  Dispense: 1 each; Refill: 0  -     varenicline (Chantix Continuing Month Pak) 1 MG tablet; Take 1 tablet by mouth 2 (Two) Times a Day for 56 days.  Dispense: 56 tablet; Refill: 1    2. Cervicalgia  -     diclofenac (VOLTAREN) 50 MG EC tablet; Take 1 tablet by mouth 2 (Two) Times a Day. for pain  Dispense: 60 tablet; Refill: 2      Patient Instructions   Continue medications as prescribed. Chantix as prescribed. Stay active. Stay hydrated with water. Recommend COVID booster and flu shot this season. Follow-up in 6 months for annual and fasting labs.          Follow Up   Return in about 6 months (around 4/30/2024) for Annual physical.  Patient was given instructions and counseling regarding his condition or for health maintenance advice. Please see specific information pulled into the AVS if appropriate.

## 2023-11-07 DIAGNOSIS — E78.2 MIXED HYPERLIPIDEMIA: ICD-10-CM

## 2023-11-07 RX ORDER — PITAVASTATIN CALCIUM 2.09 MG/1
1 TABLET, FILM COATED ORAL DAILY
Qty: 45 TABLET | Refills: 3 | Status: SHIPPED | OUTPATIENT
Start: 2023-11-07

## 2024-02-21 DIAGNOSIS — M54.2 CERVICALGIA: ICD-10-CM

## 2024-02-21 NOTE — TELEPHONE ENCOUNTER
Rx Refill Note  Requested Prescriptions     Pending Prescriptions Disp Refills    diclofenac (VOLTAREN) 50 MG EC tablet [Pharmacy Med Name: DICLOFENAC SODIUM 50MG DR TABLETS] 60 tablet 2     Sig: TAKE 1 TABLET BY MOUTH TWICE DAILY FOR PAIN      Last office visit with prescribing clinician: 10/31/2023   Last telemedicine visit with prescribing clinician: Visit date not found   Next office visit with prescribing clinician: 4/30/2024

## 2024-03-29 ENCOUNTER — OFFICE VISIT (OUTPATIENT)
Dept: CARDIOLOGY | Facility: CLINIC | Age: 59
End: 2024-03-29
Payer: COMMERCIAL

## 2024-03-29 VITALS
OXYGEN SATURATION: 100 % | HEART RATE: 62 BPM | DIASTOLIC BLOOD PRESSURE: 82 MMHG | HEIGHT: 68 IN | SYSTOLIC BLOOD PRESSURE: 120 MMHG | WEIGHT: 197 LBS | BODY MASS INDEX: 29.86 KG/M2

## 2024-03-29 DIAGNOSIS — I20.9 ANGINA PECTORIS: Primary | ICD-10-CM

## 2024-03-29 DIAGNOSIS — E78.5 HYPERLIPIDEMIA LDL GOAL <70: ICD-10-CM

## 2024-03-29 DIAGNOSIS — I25.708 CORONARY ARTERY DISEASE OF BYPASS GRAFT OF NATIVE HEART WITH STABLE ANGINA PECTORIS: ICD-10-CM

## 2024-03-29 PROCEDURE — 93000 ELECTROCARDIOGRAM COMPLETE: CPT | Performed by: INTERNAL MEDICINE

## 2024-03-29 PROCEDURE — 99204 OFFICE O/P NEW MOD 45 MIN: CPT | Performed by: INTERNAL MEDICINE

## 2024-03-29 NOTE — PROGRESS NOTES
"      CARDIOLOGY    Shyann Robertson MD    ENCOUNTER DATE:  03/29/2024    Bert Augustin / 58 y.o. / male        CHIEF COMPLAINT / REASON FOR OFFICE VISIT     C/O Chest Spasm and Establish Care      HISTORY OF PRESENT ILLNESS       HPI    Bert Augustin is a 58 y.o. male     This is a nice gentleman I saw back in 2018. He was having some chest discomfort back in 2018. His symptoms were very typical, so I sent him straight for a heart catheterization which showed a significant lesion in the proximal ramus branch and in the mid right coronary artery, both of which were stented. He moved away to [place] but is back now and wants to get re-established. He is not having any chest pain, tightness or pressure. He does have hyperlipidemia and is on Livalo. He has some myalgias. He denies chest pain, shortness of breath. No symptoms similar to his angina.         REVIEW OF SYSTEMS     Review of Systems   Constitutional: Negative for chills, fever, weight gain and weight loss.   Cardiovascular:  Negative for leg swelling.   Respiratory:  Negative for cough, snoring and wheezing.    Hematologic/Lymphatic: Negative for bleeding problem. Does not bruise/bleed easily.   Skin:  Negative for color change.   Musculoskeletal:  Negative for falls, joint pain and myalgias.   Gastrointestinal:  Negative for melena.   Genitourinary:  Negative for hematuria.   Neurological:  Negative for excessive daytime sleepiness.   Psychiatric/Behavioral:  Negative for depression. The patient is not nervous/anxious.          VITAL SIGNS     Visit Vitals  /82   Pulse 62   Ht 172.2 cm (67.8\")   Wt 89.4 kg (197 lb)   SpO2 100%   BMI 30.13 kg/m²         Wt Readings from Last 3 Encounters:   03/29/24 89.4 kg (197 lb)   12/23/23 87.5 kg (193 lb)   10/31/23 87.5 kg (193 lb)     Body mass index is 30.13 kg/m².      PHYSICAL EXAMINATION     Constitutional:       General: Not in acute distress.  Neck:      Vascular: No carotid bruit or JVD. "   Pulmonary:      Effort: Pulmonary effort is normal.      Breath sounds: Normal breath sounds.   Cardiovascular:      Normal rate. Regular rhythm.      Murmurs: There is no murmur.   Psychiatric:         Mood and Affect: Mood and affect normal.           REVIEWED DATA       ECG 12 Lead    Date/Time: 3/29/2024 9:00 AM  Performed by: Shyann Robertson MD    Authorized by: Shyann Robertson MD  Comparison: compared with previous ECG from 4/17/2018  Similar to previous ECG  Rhythm: sinus rhythm  BPM: 62  Conduction: conduction normal  ST Segments: ST segments normal  T Waves: T waves normal    Clinical impression: normal ECG            Lipid Panel          4/20/2023    11:07   Lipid Panel   Total Cholesterol 150    Triglycerides 74    HDL Cholesterol 47    VLDL Cholesterol 14    LDL Cholesterol  89        Lab Results   Component Value Date    GLUCOSE 106 (H) 04/20/2023    BUN 11 04/20/2023    CREATININE 0.87 04/20/2023    EGFRRESULT 100.6 04/20/2023    EGFR >60 01/20/2015    BCR 12.6 04/20/2023    K 4.6 04/20/2023    CO2 28.9 04/20/2023    CALCIUM 9.2 04/20/2023    PROTENTOTREF 7.5 04/20/2023    ALBUMIN 4.7 04/20/2023    BILITOT 1.1 04/20/2023    AST 20 04/20/2023    ALT 20 04/20/2023       ASSESSMENT & PLAN      Diagnosis Plan   1. Angina pectoris  Adult Stress Echo W/ Cont or Stress Agent if Necessary Per Protocol    CBC (No Diff)    Comprehensive Metabolic Panel    Lipid Panel      2. Coronary artery disease of bypass graft of native heart with stable angina pectoris  Adult Stress Echo W/ Cont or Stress Agent if Necessary Per Protocol    CBC (No Diff)    Comprehensive Metabolic Panel    Lipid Panel      3. Hyperlipidemia LDL goal <70  Adult Stress Echo W/ Cont or Stress Agent if Necessary Per Protocol    CBC (No Diff)    Comprehensive Metabolic Panel    Lipid Panel            Coronary artery disease. He has had a stent to his ramus and right coronary artery. He is having some dyspnea. I recommend checking a stress  echo to assess for any ischemia at this point in time. If that looks normal, I would encourage him to exercise on a regular basis. Follow up with the nurse practitioner in a year.   Hyperlipidemia. He is on Livalo. Goal LDL is less than 70. Check a lipid panel when he is fasting for his stress test.     One of my nurses or I will go over the results of his stress test and lab work when they become available.           Orders Placed This Encounter   Procedures    CBC (No Diff)     Standing Status:   Future     Standing Expiration Date:   3/29/2025     Order Specific Question:   Release to patient     Answer:   Routine Release [[id number]]    Comprehensive Metabolic Panel     Standing Status:   Future     Standing Expiration Date:   3/29/2025     Order Specific Question:   Release to patient     Answer:   Routine Release [[id number]]    Lipid Panel     Standing Status:   Future     Standing Expiration Date:   3/29/2025     Order Specific Question:   Release to patient     Answer:   Routine Release [[id number]]    ECG 12 Lead     This order was created via procedure documentation     Order Specific Question:   Release to patient     Answer:   Routine Release [[id number]]    Adult Stress Echo W/ Cont or Stress Agent if Necessary Per Protocol     Standing Status:   Future     Standing Expiration Date:   3/29/2025     Order Specific Question:   What stress agent will be used?     Answer:   Exercise with [employer]     Order Specific Question:   Reason for exam?     Answer:   Chest Pain     Order Specific Question:   Reason for exam?     Answer:   Coronary Artery Disease     Order Specific Question:   Release to patient     Answer:   Routine Release [[id number]]           MEDICATIONS         Discharge Medications            Accurate as of March 29, 2024  9:00 AM. If you have any questions, ask your nurse or doctor.                Continue These Medications        Instructions Start Date   aspirin 81 MG tablet    Oral      diclofenac 50 MG EC tablet  Commonly known as: VOLTAREN   TAKE 1 TABLET BY MOUTH TWICE DAILY FOR PAIN      Livalo 2 MG tablet tablet  Generic drug: pitavastatin calcium   1 mg, Oral, Daily                 Shyann Robertson MD  03/29/24  09:00 EDT    Part of this note may be an electronic transcription/translation of spoken language to printed text using the Dragon dictation system.

## 2024-04-11 ENCOUNTER — TELEPHONE (OUTPATIENT)
Dept: CARDIOLOGY | Facility: CLINIC | Age: 59
End: 2024-04-11
Payer: COMMERCIAL

## 2024-04-12 ENCOUNTER — HOSPITAL ENCOUNTER (OUTPATIENT)
Dept: CARDIOLOGY | Facility: HOSPITAL | Age: 59
Discharge: HOME | End: 2024-04-12
Payer: COMMERCIAL

## 2024-04-12 ENCOUNTER — TELEPHONE (OUTPATIENT)
Dept: CARDIOLOGY | Facility: CLINIC | Age: 59
End: 2024-04-12
Payer: COMMERCIAL

## 2024-04-12 VITALS
SYSTOLIC BLOOD PRESSURE: 118 MMHG | HEIGHT: 68 IN | WEIGHT: 197 LBS | BODY MASS INDEX: 29.86 KG/M2 | HEART RATE: 66 BPM | DIASTOLIC BLOOD PRESSURE: 80 MMHG

## 2024-04-12 DIAGNOSIS — I20.9 ANGINA PECTORIS: ICD-10-CM

## 2024-04-12 DIAGNOSIS — I25.708 CORONARY ARTERY DISEASE OF BYPASS GRAFT OF NATIVE HEART WITH STABLE ANGINA PECTORIS: ICD-10-CM

## 2024-04-12 DIAGNOSIS — E78.5 HYPERLIPIDEMIA LDL GOAL <70: ICD-10-CM

## 2024-04-12 LAB
AORTIC ARCH: 2.8 CM
AORTIC DIMENSIONLESS INDEX: 0.8 (DI)
ASCENDING AORTA: 3.2 CM
BH CV ECHO MEAS - ACS: 1.96 CM
BH CV ECHO MEAS - AO MAX PG: 5.5 MMHG
BH CV ECHO MEAS - AO MEAN PG: 3 MMHG
BH CV ECHO MEAS - AO ROOT DIAM: 2.7 CM
BH CV ECHO MEAS - AO V2 MAX: 117 CM/SEC
BH CV ECHO MEAS - AO V2 VTI: 25.4 CM
BH CV ECHO MEAS - AVA(I,D): 2.44 CM2
BH CV ECHO MEAS - EDV(CUBED): 80.7 ML
BH CV ECHO MEAS - EDV(MOD-SP2): 128 ML
BH CV ECHO MEAS - EDV(MOD-SP4): 135 ML
BH CV ECHO MEAS - EF(MOD-BP): 60.7 %
BH CV ECHO MEAS - EF(MOD-SP2): 59.4 %
BH CV ECHO MEAS - EF(MOD-SP4): 60.7 %
BH CV ECHO MEAS - ESV(CUBED): 25.8 ML
BH CV ECHO MEAS - ESV(MOD-SP2): 52 ML
BH CV ECHO MEAS - ESV(MOD-SP4): 53 ML
BH CV ECHO MEAS - FS: 31.6 %
BH CV ECHO MEAS - IVS/LVPW: 1.01 CM
BH CV ECHO MEAS - IVSD: 1.05 CM
BH CV ECHO MEAS - LAT PEAK E' VEL: 13.5 CM/SEC
BH CV ECHO MEAS - LV DIASTOLIC VOL/BSA (35-75): 66.5 CM2
BH CV ECHO MEAS - LV MASS(C)D: 151.6 GRAMS
BH CV ECHO MEAS - LV MAX PG: 3.7 MMHG
BH CV ECHO MEAS - LV MEAN PG: 2 MMHG
BH CV ECHO MEAS - LV SYSTOLIC VOL/BSA (12-30): 26.1 CM2
BH CV ECHO MEAS - LV V1 MAX: 96 CM/SEC
BH CV ECHO MEAS - LV V1 VTI: 20.5 CM
BH CV ECHO MEAS - LVIDD: 4.3 CM
BH CV ECHO MEAS - LVIDS: 3 CM
BH CV ECHO MEAS - LVOT AREA: 3 CM2
BH CV ECHO MEAS - LVOT DIAM: 1.96 CM
BH CV ECHO MEAS - LVPWD: 1.03 CM
BH CV ECHO MEAS - MED PEAK E' VEL: 8.5 CM/SEC
BH CV ECHO MEAS - MV A DUR: 0.19 SEC
BH CV ECHO MEAS - MV A MAX VEL: 92.1 CM/SEC
BH CV ECHO MEAS - MV DEC SLOPE: 265.5 CM/SEC2
BH CV ECHO MEAS - MV DEC TIME: 0.28 SEC
BH CV ECHO MEAS - MV E MAX VEL: 72 CM/SEC
BH CV ECHO MEAS - MV E/A: 0.78
BH CV ECHO MEAS - MV MAX PG: 3.4 MMHG
BH CV ECHO MEAS - MV MEAN PG: 1.2 MMHG
BH CV ECHO MEAS - MV P1/2T: 100.3 MSEC
BH CV ECHO MEAS - MV V2 VTI: 32.5 CM
BH CV ECHO MEAS - MVA(P1/2T): 2.19 CM2
BH CV ECHO MEAS - MVA(VTI): 1.91 CM2
BH CV ECHO MEAS - PA ACC TIME: 0.16 SEC
BH CV ECHO MEAS - PA V2 MAX: 105.9 CM/SEC
BH CV ECHO MEAS - PI END-D VEL: 77.4 CM/SEC
BH CV ECHO MEAS - PULM A REVS DUR: 0.13 SEC
BH CV ECHO MEAS - PULM A REVS VEL: 29.4 CM/SEC
BH CV ECHO MEAS - PULM DIAS VEL: 39.9 CM/SEC
BH CV ECHO MEAS - PULM S/D: 0.92
BH CV ECHO MEAS - PULM SYS VEL: 36.7 CM/SEC
BH CV ECHO MEAS - QP/QS: 0.97
BH CV ECHO MEAS - RAP SYSTOLE: 3 MMHG
BH CV ECHO MEAS - RV MAX PG: 2.02 MMHG
BH CV ECHO MEAS - RV V1 MAX: 71.1 CM/SEC
BH CV ECHO MEAS - RV V1 VTI: 17 CM
BH CV ECHO MEAS - RVOT DIAM: 2.12 CM
BH CV ECHO MEAS - SI(MOD-SP2): 37.4 ML/M2
BH CV ECHO MEAS - SI(MOD-SP4): 40.4 ML/M2
BH CV ECHO MEAS - SUP REN AO DIAM: 2.4 CM
BH CV ECHO MEAS - SV(LVOT): 61.9 ML
BH CV ECHO MEAS - SV(MOD-SP2): 76 ML
BH CV ECHO MEAS - SV(MOD-SP4): 82 ML
BH CV ECHO MEAS - SV(RVOT): 60.2 ML
BH CV ECHO MEAS - TAPSE (>1.6): 2.3 CM
BH CV ECHO MEASUREMENTS AVERAGE E/E' RATIO: 6.55
BH CV STRESS BP STAGE 1: NORMAL
BH CV STRESS BP STAGE 2: NORMAL
BH CV STRESS BP STAGE 3: NORMAL
BH CV STRESS DURATION MIN STAGE 1: 3
BH CV STRESS DURATION MIN STAGE 2: 3
BH CV STRESS DURATION MIN STAGE 3: 2
BH CV STRESS DURATION SEC STAGE 1: 0
BH CV STRESS DURATION SEC STAGE 2: 0
BH CV STRESS DURATION SEC STAGE 3: 30
BH CV STRESS ECHO POST STRESS EJECTION FRACTION EF: 78 %
BH CV STRESS GRADE STAGE 1: 10
BH CV STRESS GRADE STAGE 2: 12
BH CV STRESS GRADE STAGE 3: 14
BH CV STRESS HR STAGE 1: 98
BH CV STRESS HR STAGE 2: 118
BH CV STRESS HR STAGE 3: 143
BH CV STRESS METS STAGE 1: 5
BH CV STRESS METS STAGE 2: 7.5
BH CV STRESS METS STAGE 3: 10
BH CV STRESS PROTOCOL 1: NORMAL
BH CV STRESS SPEED STAGE 1: 1.7
BH CV STRESS SPEED STAGE 2: 2.5
BH CV STRESS SPEED STAGE 3: 3.4
BH CV STRESS STAGE 1: 1
BH CV STRESS STAGE 2: 2
BH CV STRESS STAGE 3: 3
BH CV XLRA - RV BASE: 3 CM
BH CV XLRA - RV LENGTH: 7.7 CM
BH CV XLRA - RV MID: 2.33 CM
BH CV XLRA - TDI S': 18.3 CM/SEC
LEFT ATRIUM VOLUME INDEX: 19.3 ML/M2
MAXIMAL PREDICTED HEART RATE: 162 BPM
PERCENT MAX PREDICTED HR: 88.27 %
SINUS: 3.4 CM
STJ: 3 CM
STRESS BASELINE BP: NORMAL MMHG
STRESS BASELINE HR: 64 BPM
STRESS PERCENT HR: 104 %
STRESS POST ESTIMATED WORKLOAD: 10 METS
STRESS POST EXERCISE DUR MIN: 8 MIN
STRESS POST EXERCISE DUR SEC: 30 SEC
STRESS POST PEAK BP: NORMAL MMHG
STRESS POST PEAK HR: 143 BPM
STRESS TARGET HR: 138 BPM

## 2024-04-12 PROCEDURE — 93325 DOPPLER ECHO COLOR FLOW MAPG: CPT

## 2024-04-12 PROCEDURE — 93017 CV STRESS TEST TRACING ONLY: CPT

## 2024-04-12 PROCEDURE — 93320 DOPPLER ECHO COMPLETE: CPT

## 2024-04-12 PROCEDURE — 93350 STRESS TTE ONLY: CPT

## 2024-04-12 PROCEDURE — 25510000001 PERFLUTREN (DEFINITY) 8.476 MG IN SODIUM CHLORIDE (PF) 0.9 % 10 ML INJECTION: Performed by: INTERNAL MEDICINE

## 2024-04-12 RX ADMIN — PERFLUTREN 4 ML: 6.52 INJECTION, SUSPENSION INTRAVENOUS at 11:04

## 2024-04-12 NOTE — TELEPHONE ENCOUNTER
Please let him know that his stress test looks normal.  I encouraged him to exercise on a regular basis and follow-up next year with a nurse practitioner.  Call sooner if symptoms change.

## 2024-04-12 NOTE — TELEPHONE ENCOUNTER
Called and left VM, will continue to try to reach pt.    HUB- please put patient straight through to triage    Steph Escamilla RN  Triage RN  04/12/24 15:30 EDT

## 2024-04-15 NOTE — TELEPHONE ENCOUNTER
Called and left VM, will continue to try to reach pt.    HUB- please put patient straight through to triage    Steph Escamilla, RN  Triage RN  04/15/24 12:06 EDT

## 2024-04-16 NOTE — TELEPHONE ENCOUNTER
Met with patient's wife Elizabeth to discuss dc plan, FOC obtained for TCU, Gris notified.   Results and recommendations called to pt.  Instructed to call with any further questions or concerns.  Verbalized understanding.  Follow up scheduled as requested.    Steph Escamilla RN  Triage Nurse, [facility]  04/16/24 10:22 EDT

## 2024-05-18 DIAGNOSIS — M54.2 CERVICALGIA: ICD-10-CM

## 2024-06-23 DIAGNOSIS — M54.2 CERVICALGIA: ICD-10-CM

## 2024-06-24 ENCOUNTER — TELEPHONE (OUTPATIENT)
Dept: INTERNAL MEDICINE | Facility: CLINIC | Age: 59
End: 2024-06-24
Payer: COMMERCIAL

## 2024-06-24 NOTE — TELEPHONE ENCOUNTER
Patient called re diclofenac refill. Advised that he was due his prev exam/labs in April. Asked to schedule, he is not able to at this time but stated he will call back. Will await his call back.

## 2024-11-06 DIAGNOSIS — E78.2 MIXED HYPERLIPIDEMIA: ICD-10-CM

## 2024-11-06 RX ORDER — PITAVASTATIN CALCIUM 2.09 MG/1
1 TABLET, FILM COATED ORAL DAILY
Qty: 45 TABLET | Refills: 3 | Status: SHIPPED | OUTPATIENT
Start: 2024-11-06

## 2025-07-14 ENCOUNTER — OFFICE VISIT (OUTPATIENT)
Dept: PODIATRY | Facility: CLINIC | Age: 60
End: 2025-07-14
Payer: COMMERCIAL

## 2025-07-14 VITALS
TEMPERATURE: 97.3 F | HEART RATE: 64 BPM | DIASTOLIC BLOOD PRESSURE: 74 MMHG | BODY MASS INDEX: 30.31 KG/M2 | SYSTOLIC BLOOD PRESSURE: 115 MMHG | WEIGHT: 200 LBS | HEIGHT: 68 IN | OXYGEN SATURATION: 96 %

## 2025-07-14 DIAGNOSIS — M76.61 ACHILLES TENDINITIS OF BOTH LOWER EXTREMITIES: ICD-10-CM

## 2025-07-14 DIAGNOSIS — M76.62 ACHILLES TENDINITIS OF BOTH LOWER EXTREMITIES: ICD-10-CM

## 2025-07-14 DIAGNOSIS — M77.30 POSTERIOR CALCANEAL EXOSTOSIS, UNSPECIFIED LATERALITY: Primary | ICD-10-CM

## 2025-07-14 PROCEDURE — 99203 OFFICE O/P NEW LOW 30 MIN: CPT | Performed by: PODIATRIST

## 2025-07-14 RX ORDER — METHYLPREDNISOLONE 4 MG/1
TABLET ORAL
Qty: 21 TABLET | Refills: 0 | Status: SHIPPED | OUTPATIENT
Start: 2025-07-14

## 2025-07-14 NOTE — PROGRESS NOTES
[facility] - PODIATRY    Today's Date: 07/14/25    Patient Name: Bert Augustin  MRN: [id number]  CSN: [id number]  PCP: Davon Pang APRN,   Referring Provider: Referring, Self    SUBJECTIVE     Chief Complaint   Patient presents with    Left Foot - Pain, Establish Care     Dx plantar fascitis injection in 2014 which really helped  has worn inserts in the past worse in past 6 mos      HPI: Bert Augustin, a 59 y.o.male, presents to clinic.    History of Present Illness  The patient presents for evaluation of left foot pain.    He has been experiencing issues with his left foot for several years, which have significantly worsened over the past 6 months. He has a history of bone spurs, dating back approximately 30 years. In 2014, he received an injection that provided substantial relief. However, he has been dealing with intermittent problems since then, but nothing as severe as his current condition. He describes the pain as being on both sides of his foot, causing him to limp and favor the foot. He also reports difficulty in pushing off the foot. His last x-ray was conducted about a decade ago.            Past Medical History:   Diagnosis Date    3-vessel CAD     CAD (coronary artery disease)     Cervical spinal stenosis     Chest pain     Difficulty walking 1996    Diverticulosis 1/2015    Stable. No recent flare-up since 2020    H/O Diverticulitis     Headache, tension-type     Hepatitis C     Hyperlipidemia     Impingement syndrome of right shoulder     Migraine     Myalgia     Neck pain     Poor sleep     Sleep apnea      Past Surgical History:   Procedure Laterality Date    COLONOSCOPY N/A 07/24/2017    lipomatous ileocecal valve, diverticulosis in sigmoid colon    CORONARY ANGIOPLASTY WITH STENT PLACEMENT      Drug-eluting stent to the proximal ramus branch.  Drug-eluting stent to the mid right coronary artery.  January 2015.    HERNIA REPAIR      INGUINAL HERNIA REPAIR  1998     Family  History   Problem Relation Age of Onset    Heart disease Mother     Heart attack Mother     Other Mother         Pacemaker    Heart disease Father     Hypertension Sister     Heart attack Sister     Heart attack Brother      Social History     Socioeconomic History    Marital status:      Spouse name: Bella    Number of children: 5    Years of education: College   Tobacco Use    Smoking status: Passive Smoke Exposure - Never Smoker    Smokeless tobacco: Never    Tobacco comments:     No cigarettes since 4/1/12   Vaping Use    Vaping status: Never Used   Substance and Sexual Activity    Alcohol use: Never     Comment: 20+ years- no alcohol    Drug use: Never    Sexual activity: Yes     Partners: Female     Birth control/protection: None     Comment: .  Monogamous     No Known Allergies  Current Outpatient Medications   Medication Sig Dispense Refill    aspirin 81 MG tablet Take  by mouth.      pitavastatin calcium (LIVALO) 2 MG tablet tablet TAKE ONE-HALF (1/2) TABLET DAILY 45 tablet 3    diclofenac (VOLTAREN) 50 MG EC tablet TAKE 1 TABLET BY MOUTH TWICE DAILY FOR PAIN (Patient not taking: Reported on 7/14/2025) 60 tablet 2    methylPREDNISolone (MEDROL) 4 MG dose pack Take as directed 21 tablet 0     No current facility-administered medications for this visit.         OBJECTIVE     Vitals:    07/14/25 0937   BP: 115/74   Pulse: 64   Temp: 97.3 °F (36.3 °C)   SpO2: 96%       WBC   Date Value Ref Range Status   04/20/2023 4.34 3.40 - 10.80 10*3/mm3 Final     RBC   Date Value Ref Range Status   04/20/2023 4.83 4.14 - 5.80 10*6/mm3 Final     Hemoglobin   Date Value Ref Range Status   04/20/2023 14.4 13.0 - 17.7 g/dL Final   05/11/2018 14.3 13.5 - 16.5 g/dL Final     Hematocrit   Date Value Ref Range Status   04/20/2023 41.5 37.5 - 51.0 % Final   05/11/2018 41.4 40.0 - 49.0 % Final     MCV   Date Value Ref Range Status   04/20/2023 85.9 79.0 - 97.0 fL Final   05/11/2018 83.8 83.0 - 97.0 fL Final     MCH    Date Value Ref Range Status   04/20/2023 29.8 26.6 - 33.0 pg Final   05/11/2018 28.9 27.0 - 33.0 pg Final     MCHC   Date Value Ref Range Status   04/20/2023 34.7 31.5 - 35.7 g/dL Final   05/11/2018 34.5 32.0 - 35.0 g/dL Final     RDW   Date Value Ref Range Status   04/20/2023 13.3 12.3 - 15.4 % Final   05/11/2018 11.9 11.7 - 14.5 % Final     RDW-SD   Date Value Ref Range Status   05/11/2018 35.8 (L) 37.0 - 49.0 fl Final     MPV   Date Value Ref Range Status   05/11/2018 9.3 8.9 - 12.1 fL Final     Platelets   Date Value Ref Range Status   04/20/2023 106 (L) 140 - 450 10*3/mm3 Final   05/11/2018 107 (L) 150 - 375 10*3/mm3 Final     Neutrophil Rel %   Date Value Ref Range Status   04/20/2023 46.4 42.7 - 76.0 % Final     Neutrophil %   Date Value Ref Range Status   05/11/2018 40.2 39.0 - 75.0 % Final     Lymphocyte Rel %   Date Value Ref Range Status   04/20/2023 38.2 19.6 - 45.3 % Final     Lymphocyte %   Date Value Ref Range Status   05/11/2018 45.0 20.0 - 49.0 % Final     Monocyte Rel %   Date Value Ref Range Status   04/20/2023 9.9 5.0 - 12.0 % Final     Monocyte %   Date Value Ref Range Status   05/11/2018 9.2 4.0 - 12.0 % Final     Eosinophil Rel %   Date Value Ref Range Status   04/20/2023 4.8 0.3 - 6.2 % Final     Eosinophil %   Date Value Ref Range Status   05/11/2018 4.6 1.0 - 5.0 % Final     Basophil Rel %   Date Value Ref Range Status   04/20/2023 0.5 0.0 - 1.5 % Final     Basophil %   Date Value Ref Range Status   05/11/2018 0.7 0.0 - 1.1 % Final     Immature Grans %   Date Value Ref Range Status   05/11/2018 0.3 0.0 - 0.5 % Final     Neutrophils Absolute   Date Value Ref Range Status   04/20/2023 2.01 1.70 - 7.00 10*3/mm3 Final     Neutrophils, Absolute   Date Value Ref Range Status   05/11/2018 2.83 1.50 - 7.00 10*3/mm3 Final     Lymphocytes Absolute   Date Value Ref Range Status   04/20/2023 1.66 0.70 - 3.10 10*3/mm3 Final     Lymphocytes, Absolute   Date Value Ref Range Status   05/11/2018 3.16 1.00  - 3.50 10*3/mm3 Final     Monocytes Absolute   Date Value Ref Range Status   04/20/2023 0.43 0.10 - 0.90 10*3/mm3 Final     Monocytes, Absolute   Date Value Ref Range Status   05/11/2018 0.65 0.25 - 0.80 10*3/mm3 Final     Eosinophils Absolute   Date Value Ref Range Status   04/20/2023 0.21 0.00 - 0.40 10*3/mm3 Final     Eosinophils, Absolute   Date Value Ref Range Status   05/11/2018 0.32 0.00 - 0.36 10*3/mm3 Final     Basophils Absolute   Date Value Ref Range Status   04/20/2023 0.02 0.00 - 0.20 10*3/mm3 Final     Basophils, Absolute   Date Value Ref Range Status   05/11/2018 0.05 0.00 - 0.10 10*3/mm3 Final     Immature Grans, Absolute   Date Value Ref Range Status   05/11/2018 0.02 0.00 - 0.03 10*3/mm3 Final     nRBC   Date Value Ref Range Status   05/11/2018 0.0 0.0 - 0.0 /100 WBC Final         Lab Results   Component Value Date    GLUCOSE 106 (H) 04/20/2023    BUN 11 04/20/2023    CREATININE 0.87 04/20/2023    EGFRIFNONA 89 05/04/2018    EGFRIFAFRI 107 05/04/2018    BCR 12.6 04/20/2023    K 4.6 04/20/2023    CO2 28.9 04/20/2023    CALCIUM 9.2 04/20/2023    ALBUMIN 4.7 04/20/2023    AST 20 04/20/2023    ALT 20 04/20/2023       Patient seen in no apparent distress.      PHYSICAL EXAM:     Foot/Ankle Exam    GENERAL  Appearance:  appears stated age  Orientation:  AAOx3  Affect:  appropriate  Gait:  unimpaired  Assistance:  independent  Right shoe gear: casual shoe  Left shoe gear: casual shoe    VASCULAR     Right Foot Vascularity   Normal vascular exam    Dorsalis pedis:  2+  Posterior tibial:  2+  Skin temperature:  warm  Edema grading:  None  CFT:  < 3 seconds  Pedal hair growth:  Present  Varicosities:  none     Left Foot Vascularity   Normal vascular exam    Dorsalis pedis:  2+  Posterior tibial:  2+  Skin temperature:  warm  Edema grading:  None  CFT:  < 3 seconds  Pedal hair growth:  Present  Varicosities:  none     NEUROLOGIC     Right Foot Neurologic   Normal sensation    Light touch sensation:  normal  Vibratory sensation: normal  Hot/Cold sensation: normal  Protective Sensation using Harwood-Dora Monofilament:   Sites intact: 10  Sites tested: 10     Left Foot Neurologic   Normal sensation    Light touch sensation: normal  Vibratory sensation: normal  Hot/Cold sensation:  normal  Protective Sensation using Harwood-Dora Monofilament:   Sites intact: 10  Sites tested: 10    MUSCULOSKELETAL     Right Foot Musculoskeletal   Tenderness:  achilles tendon tenderness and retrocalcaneal bursa tenderness       Left Foot Musculoskeletal   Tenderness:  achilles tendon tenderness and retrocalcaneal bursa tenderness    MUSCLE STRENGTH     Right Foot Muscle Strength   Foot dorsiflexion:  4  Foot plantar flexion:  4  Foot inversion:  4  Foot eversion:  4     Left Foot Muscle Strength   Foot dorsiflexion:  4  Foot plantar flexion:  4  Foot inversion:  4  Foot eversion:  4    RANGE OF MOTION     Right Foot Range of Motion   Foot and ankle ROM within normal limits       Left Foot Range of Motion   Foot and ankle ROM within normal limits      DERMATOLOGIC      Right Foot Dermatologic   Skin  Right foot skin is intact.      Left Foot Dermatologic   Skin  Left foot skin is intact.       RADIOLOGY:        No results found.    ASSESSMENT/PLAN     Diagnoses and all orders for this visit:    1. Posterior calcaneal exostosis, unspecified laterality (Primary)    2. Achilles tendinitis of both lower extremities    Other orders  -     methylPREDNISolone (MEDROL) 4 MG dose pack; Take as directed  Dispense: 21 tablet; Refill: 0        Comprehensive lower extremity examination and evaluation was performed.    Assessment & Plan  Patient to begin stretching exercises and icing in the evening as tolerated. Discussed compression therapy and resting the extremity.  Anti-inflammatory medication to begin taking if okay by PCP.    Boot dispensed  Medrol Dosepak prescribed  Discussed surgical options with patient    Follow-up  A follow-up  appointment will be scheduled in 6 to 8 weeks to monitor his progress.       Discussed findings and treatment plan including risks, benefits, and treatment options with patient in detail. Patient agreed with treatment plan.    Medications and allergies reviewed.  Reviewed available lab values along with other pertinent labs.  These were discussed with the patient.    All questions were answered to patient/family satisfaction. Should symptoms fail to improve or worsen they agree to call or return to clinic or to go to the Emergency Department. Discussed the importance of following up with any needed screening tests/labs/specialist appointments and any requested follow-up recommended by me today. Importance of maintaining follow-up discussed and patient accepts that missed appointments can delay diagnosis and potentially lead to worsening of conditions.    No follow-ups on file., or sooner if acute issues arise.    Patient or patient representative verbalized consent for the use of Ambient Listening during the visit with  Cam Avalos DPM for chart documentation. 7/14/2025  10:45 EDT    This document has been electronically signed by Cam Avalos DPM on July 14, 2025 10:44 EDT

## 2025-08-21 DIAGNOSIS — M54.2 CERVICALGIA: ICD-10-CM

## 2025-08-25 DIAGNOSIS — M54.2 CERVICALGIA: ICD-10-CM

## 2025-08-29 ENCOUNTER — OFFICE VISIT (OUTPATIENT)
Dept: FAMILY MEDICINE CLINIC | Facility: CLINIC | Age: 60
End: 2025-08-29
Payer: COMMERCIAL

## 2025-08-29 VITALS
OXYGEN SATURATION: 98 % | SYSTOLIC BLOOD PRESSURE: 113 MMHG | DIASTOLIC BLOOD PRESSURE: 84 MMHG | BODY MASS INDEX: 30.31 KG/M2 | HEART RATE: 77 BPM | HEIGHT: 68 IN | WEIGHT: 200 LBS

## 2025-08-29 DIAGNOSIS — R06.83 SNORING: ICD-10-CM

## 2025-08-29 DIAGNOSIS — Z76.89 ESTABLISHING CARE WITH NEW DOCTOR, ENCOUNTER FOR: Primary | ICD-10-CM

## 2025-08-29 DIAGNOSIS — Z13.29 SCREENING FOR THYROID DISORDER: ICD-10-CM

## 2025-08-29 DIAGNOSIS — Z72.0 TOBACCO USE: ICD-10-CM

## 2025-08-29 DIAGNOSIS — Z13.1 SCREENING FOR DIABETES MELLITUS: ICD-10-CM

## 2025-08-29 DIAGNOSIS — Z13.220 SCREENING FOR LIPID DISORDERS: ICD-10-CM

## 2025-08-29 DIAGNOSIS — E78.2 MIXED HYPERLIPIDEMIA: ICD-10-CM

## 2025-08-29 DIAGNOSIS — M54.2 CERVICALGIA: ICD-10-CM

## 2025-08-29 DIAGNOSIS — R06.81 WITNESSED EPISODE OF APNEA: ICD-10-CM

## 2025-08-29 DIAGNOSIS — Z00.00 ANNUAL PHYSICAL EXAM: ICD-10-CM

## 2025-08-29 RX ORDER — VARENICLINE TARTRATE 1 MG/1
1 TABLET, FILM COATED ORAL 2 TIMES DAILY
Qty: 56 TABLET | Refills: 1 | Status: SHIPPED | OUTPATIENT
Start: 2025-09-26 | End: 2025-11-21

## 2025-08-29 RX ORDER — PITAVASTATIN CALCIUM 2.09 MG/1
1 TABLET, FILM COATED ORAL DAILY
Qty: 45 TABLET | Refills: 3 | Status: SHIPPED | OUTPATIENT
Start: 2025-08-29

## 2025-08-29 RX ORDER — VARENICLINE TARTRATE 0.5 (11)-1
0.5 KIT ORAL DAILY
Qty: 53 EACH | Refills: 0 | Status: SHIPPED | OUTPATIENT
Start: 2025-08-29 | End: 2025-09-25

## (undated) DEVICE — Device: Brand: DEFENDO AIR/WATER/SUCTION AND BIOPSY VALVE

## (undated) DEVICE — CANNULA,ADULT,SOFT-TOUCH,7TUBE,SC: Brand: MEDLINE

## (undated) DEVICE — THE TORRENT IRRIGATION SCOPE CONNECTOR IS USED WITH THE TORRENT IRRIGATION TUBING TO PROVIDE IRRIGATION FLUIDS SUCH AS STERILE WATER DURING GASTROINTESTINAL ENDOSCOPIC PROCEDURES WHEN USED IN CONJUNCTION WITH AN IRRIGATION PUMP (OR ELECTROSURGICAL UNIT).: Brand: TORRENT

## (undated) DEVICE — SINGLE-USE BIOPSY FORCEPS: Brand: RADIAL JAW 4

## (undated) DEVICE — TUBING, SUCTION, 1/4" X 10', STRAIGHT: Brand: MEDLINE

## (undated) DEVICE — TBG 02 CRUSH RESIST LF CLR 7FT